# Patient Record
Sex: MALE | Employment: OTHER | URBAN - METROPOLITAN AREA
[De-identification: names, ages, dates, MRNs, and addresses within clinical notes are randomized per-mention and may not be internally consistent; named-entity substitution may affect disease eponyms.]

---

## 2019-03-14 LAB
EXT 24 HR UR METANEPHRINE: NORMAL
EXT 24 HR UR METANEPHRINE: NORMAL
EXT 24 HR UR NORMETANEPHRINE: NORMAL
EXT 25 HYDROXY VIT D2: NORMAL
EXT 25 HYDROXY VIT D2: NORMAL
EXT 25 HYDROXY VIT D3: NORMAL
EXT 25 HYDROXY VIT D3: NORMAL
EXT 5 HIAA 24 HR URINE: NORMAL
EXT 5 HIAA 24 HR URINE: NORMAL
EXT 5 HIAA BLOOD: NORMAL
EXT 5 HIAA BLOOD: NORMAL
EXT ACTH: NORMAL
EXT ACTH: NORMAL
EXT AFP: NORMAL
EXT AFP: NORMAL
EXT ALBUMIN: NORMAL
EXT ALBUMIN: NORMAL
EXT ALKALINE PHOSPHATASE: NORMAL
EXT ALKALINE PHOSPHATASE: NORMAL
EXT ALT: NORMAL
EXT ALT: NORMAL
EXT AMYLASE: NORMAL
EXT AMYLASE: NORMAL
EXT ANTI ISLET CELL AB: NORMAL
EXT ANTI ISLET CELL AB: NORMAL
EXT ANTI PARIETAL CELL AB: NORMAL
EXT ANTI PARIETAL CELL AB: NORMAL
EXT ANTI THYROID AB: NORMAL
EXT ANTI THYROID AB: NORMAL
EXT AST: NORMAL
EXT AST: NORMAL
EXT BILIRUBIN DIRECT: NORMAL
EXT BILIRUBIN DIRECT: NORMAL
EXT BILIRUBIN TOTAL: NORMAL
EXT BILIRUBIN TOTAL: NORMAL
EXT BK VIRUS DNA QN PCR: NORMAL
EXT BK VIRUS DNA QN PCR: NORMAL
EXT BUN: NORMAL
EXT BUN: NORMAL
EXT C PEPTIDE: NORMAL
EXT C PEPTIDE: NORMAL
EXT CA 125: NORMAL
EXT CA 125: NORMAL
EXT CA 19-9: NORMAL
EXT CA 19-9: NORMAL
EXT CA 27-29: NORMAL
EXT CA 27-29: NORMAL
EXT CALCITONIN: NORMAL
EXT CALCITONIN: NORMAL
EXT CALCIUM: NORMAL
EXT CALCIUM: NORMAL
EXT CEA: NORMAL
EXT CEA: NORMAL
EXT CHLORIDE: NORMAL
EXT CHLORIDE: NORMAL
EXT CHOLESTEROL: 115 MG/DL (ref 0–240)
EXT CHOLESTEROL: NORMAL
EXT CHROMOGRANIN A: NORMAL
EXT CHROMOGRANIN A: NORMAL
EXT CO2: NORMAL
EXT CO2: NORMAL
EXT CREATININE UA: NORMAL
EXT CREATININE UA: NORMAL
EXT CREATININE: NORMAL
EXT CREATININE: NORMAL
EXT CYCLOSPORONE LEVEL: NORMAL
EXT CYCLOSPORONE LEVEL: NORMAL
EXT DOPAMINE: NORMAL
EXT DOPAMINE: NORMAL
EXT EBV DNA BY PCR: NORMAL
EXT EBV DNA BY PCR: NORMAL
EXT EPINEPHRINE: NORMAL
EXT EPINEPHRINE: NORMAL
EXT FOLATE: NORMAL
EXT FOLATE: NORMAL
EXT FREE T3: NORMAL
EXT FREE T3: NORMAL
EXT FREE T4: NORMAL
EXT FREE T4: NORMAL
EXT FSH: NORMAL
EXT FSH: NORMAL
EXT GASTRIN RELEASING PEPTIDE: NORMAL
EXT GASTRIN: NORMAL
EXT GASTRIN: NORMAL
EXT GGT: NORMAL
EXT GGT: NORMAL
EXT GHRELIN: NORMAL
EXT GHRELIN: NORMAL
EXT GLUCAGON: NORMAL
EXT GLUCAGON: NORMAL
EXT GLUCOSE: NORMAL
EXT GLUCOSE: NORMAL
EXT GROWTH HORMONE: NORMAL
EXT GROWTH HORMONE: NORMAL
EXT HCV RNA QUANT PCR: NORMAL
EXT HCV RNA QUANT PCR: NORMAL
EXT HDL: 24.4 MG/DL
EXT HDL: 24.4 MG/DL
EXT HEMATOCRIT: NORMAL
EXT HEMATOCRIT: NORMAL
EXT HEMOGLOBIN A1C: NORMAL
EXT HEMOGLOBIN A1C: NORMAL
EXT HEMOGLOBIN: NORMAL
EXT HEMOGLOBIN: NORMAL
EXT HISTAMINE 24 HR URINE: NORMAL
EXT HISTAMINE 24 HR URINE: NORMAL
EXT HISTAMINE: NORMAL
EXT HISTAMINE: NORMAL
EXT IGF-1: NORMAL
EXT IGF-1: NORMAL
EXT IMMUNKNOW (NON-STIMULATED): NORMAL
EXT IMMUNKNOW (NON-STIMULATED): NORMAL
EXT IMMUNKNOW (STIMULATED): NORMAL
EXT IMMUNKNOW (STIMULATED): NORMAL
EXT INR: NORMAL
EXT INR: NORMAL
EXT INSULIN: NORMAL
EXT INSULIN: NORMAL
EXT LANREOTIDE LEVEL: NORMAL
EXT LANREOTIDE LEVEL: NORMAL
EXT LDH, TOTAL: 53.6 MG/DL (ref 0–100)
EXT LDH, TOTAL: 53.6 MG/DL (ref 0–100)
EXT LDL CHOLESTEROL: NORMAL
EXT LDL CHOLESTEROL: NORMAL
EXT LIPASE: NORMAL
EXT LIPASE: NORMAL
EXT MAGNESIUM: NORMAL
EXT MAGNESIUM: NORMAL
EXT METANEPHRINE FREE PLASMA: NORMAL
EXT METANEPHRINE FREE PLASMA: NORMAL
EXT MOTILIN: NORMAL
EXT MOTILIN: NORMAL
EXT NEUROKININ A CAMB: NORMAL
EXT NEUROKININ A CAMB: NORMAL
EXT NEUROKININ A ISI: NORMAL
EXT NEUROKININ A ISI: NORMAL
EXT NEUROTENSIN: NORMAL
EXT NEUROTENSIN: NORMAL
EXT NOREPINEPHRINE: NORMAL
EXT NOREPINEPHRINE: NORMAL
EXT NORMETANEPHRINE: NORMAL
EXT NORMETANEPHRINE: NORMAL
EXT NSE: NORMAL
EXT NSE: NORMAL
EXT OCTREOTIDE LEVEL: NORMAL
EXT OCTREOTIDE LEVEL: NORMAL
EXT PANCREASTATIN CAMB: NORMAL
EXT PANCREASTATIN CAMB: NORMAL
EXT PANCREASTATIN ISI: NORMAL
EXT PANCREASTATIN ISI: NORMAL
EXT PANCREATIC POLYPEPTIDE: NORMAL
EXT PANCREATIC POLYPEPTIDE: NORMAL
EXT PHOSPHORUS: NORMAL
EXT PHOSPHORUS: NORMAL
EXT PLATELETS: NORMAL
EXT PLATELETS: NORMAL
EXT POTASSIUM: NORMAL
EXT POTASSIUM: NORMAL
EXT PROGRAF LEVEL: NORMAL
EXT PROGRAF LEVEL: NORMAL
EXT PROLACTIN: NORMAL
EXT PROLACTIN: NORMAL
EXT PROTEIN TOTAL: NORMAL
EXT PROTEIN TOTAL: NORMAL
EXT PROTEIN UA: NORMAL
EXT PROTEIN UA: NORMAL
EXT PT: NORMAL
EXT PT: NORMAL
EXT PTH, INTACT: NORMAL
EXT PTH, INTACT: NORMAL
EXT PTT: NORMAL
EXT PTT: NORMAL
EXT RAPAMUNE LEVEL: NORMAL
EXT RAPAMUNE LEVEL: NORMAL
EXT SEROTONIN: NORMAL
EXT SEROTONIN: NORMAL
EXT SODIUM: NORMAL
EXT SODIUM: NORMAL
EXT SOMATOSTATIN: NORMAL
EXT SOMATOSTATIN: NORMAL
EXT SUBSTANCE P: NORMAL
EXT SUBSTANCE P: NORMAL
EXT TRIGLYCERIDES: 185 MG/DL (ref 0–200)
EXT TRIGLYCERIDES: 185 MG/DL (ref 0–200)
EXT TRYPTASE: NORMAL
EXT TRYPTASE: NORMAL
EXT TSH: NORMAL
EXT TSH: NORMAL
EXT URIC ACID: NORMAL
EXT URIC ACID: NORMAL
EXT URINE AMYLASE U/HR: NORMAL
EXT URINE AMYLASE U/HR: NORMAL
EXT URINE AMYLASE U/L: NORMAL
EXT URINE AMYLASE U/L: NORMAL
EXT VASOACTIVE INTESTINAL POLYPEPTIDE: NORMAL
EXT VASOACTIVE INTESTINAL POLYPEPTIDE: NORMAL
EXT VITAMIN B12: NORMAL
EXT VITAMIN B12: NORMAL
EXT VMA 24 HR URINE: NORMAL
EXT VMA 24 HR URINE: NORMAL
EXT WBC: NORMAL
EXT WBC: NORMAL
NEURON SPECIFIC ENOLASE: NORMAL
NEURON SPECIFIC ENOLASE: NORMAL

## 2019-08-22 LAB
EXT 24 HR UR METANEPHRINE: ABNORMAL
EXT 24 HR UR NORMETANEPHRINE: ABNORMAL
EXT 24 HR UR NORMETANEPHRINE: ABNORMAL
EXT 25 HYDROXY VIT D2: ABNORMAL
EXT 25 HYDROXY VIT D3: ABNORMAL
EXT 5 HIAA 24 HR URINE: ABNORMAL
EXT 5 HIAA BLOOD: ABNORMAL
EXT ACTH: ABNORMAL
EXT AFP: ABNORMAL
EXT ALBUMIN: ABNORMAL
EXT ALKALINE PHOSPHATASE: ABNORMAL
EXT ALT: ABNORMAL
EXT AMYLASE: ABNORMAL
EXT ANTI ISLET CELL AB: ABNORMAL
EXT ANTI PARIETAL CELL AB: ABNORMAL
EXT ANTI THYROID AB: ABNORMAL
EXT AST: ABNORMAL
EXT BILIRUBIN DIRECT: ABNORMAL
EXT BILIRUBIN TOTAL: 0.9 MG/DL (ref 0.1–1.3)
EXT BK VIRUS DNA QN PCR: ABNORMAL
EXT BUN: ABNORMAL
EXT C PEPTIDE: ABNORMAL
EXT CA 125: ABNORMAL
EXT CA 19-9: ABNORMAL
EXT CA 27-29: ABNORMAL
EXT CALCITONIN: ABNORMAL
EXT CALCIUM: ABNORMAL
EXT CEA: ABNORMAL
EXT CHLORIDE: ABNORMAL
EXT CHOLESTEROL: ABNORMAL
EXT CHROMOGRANIN A: ABNORMAL
EXT CO2: ABNORMAL
EXT CREATININE UA: ABNORMAL
EXT CREATININE: ABNORMAL
EXT CYCLOSPORONE LEVEL: ABNORMAL
EXT DOPAMINE: ABNORMAL
EXT EBV DNA BY PCR: ABNORMAL
EXT EPINEPHRINE: ABNORMAL
EXT FOLATE: ABNORMAL
EXT FREE T3: ABNORMAL
EXT FREE T4: ABNORMAL
EXT FSH: ABNORMAL
EXT GASTRIN RELEASING PEPTIDE: ABNORMAL
EXT GASTRIN RELEASING PEPTIDE: ABNORMAL
EXT GASTRIN: ABNORMAL
EXT GGT: ABNORMAL
EXT GHRELIN: ABNORMAL
EXT GLUCAGON: ABNORMAL
EXT GLUCOSE: ABNORMAL
EXT GROWTH HORMONE: ABNORMAL
EXT HCV RNA QUANT PCR: ABNORMAL
EXT HDL: ABNORMAL
EXT HEMATOCRIT: ABNORMAL
EXT HEMOGLOBIN A1C: 7.8 % (ref 4.2–5.8)
EXT HEMOGLOBIN: ABNORMAL
EXT HISTAMINE 24 HR URINE: ABNORMAL
EXT HISTAMINE: ABNORMAL
EXT IGF-1: ABNORMAL
EXT IMMUNKNOW (NON-STIMULATED): ABNORMAL
EXT IMMUNKNOW (STIMULATED): ABNORMAL
EXT INR: ABNORMAL
EXT INSULIN: ABNORMAL
EXT LANREOTIDE LEVEL: ABNORMAL
EXT LDH, TOTAL: ABNORMAL
EXT LDL CHOLESTEROL: ABNORMAL
EXT LIPASE: ABNORMAL
EXT MAGNESIUM: ABNORMAL
EXT METANEPHRINE FREE PLASMA: ABNORMAL
EXT MOTILIN: ABNORMAL
EXT NEUROKININ A CAMB: ABNORMAL
EXT NEUROKININ A ISI: ABNORMAL
EXT NEUROTENSIN: ABNORMAL
EXT NOREPINEPHRINE: ABNORMAL
EXT NORMETANEPHRINE: ABNORMAL
EXT NSE: ABNORMAL
EXT OCTREOTIDE LEVEL: ABNORMAL
EXT PANCREASTATIN CAMB: ABNORMAL
EXT PANCREASTATIN ISI: ABNORMAL
EXT PANCREATIC POLYPEPTIDE: ABNORMAL
EXT PHOSPHORUS: ABNORMAL
EXT PLATELETS: ABNORMAL
EXT POTASSIUM: ABNORMAL
EXT PROGRAF LEVEL: ABNORMAL
EXT PROLACTIN: ABNORMAL
EXT PROTEIN TOTAL: ABNORMAL
EXT PROTEIN UA: ABNORMAL
EXT PT: ABNORMAL
EXT PTH, INTACT: ABNORMAL
EXT PTT: ABNORMAL
EXT RAPAMUNE LEVEL: ABNORMAL
EXT SEROTONIN: ABNORMAL
EXT SODIUM: ABNORMAL
EXT SOMATOSTATIN: ABNORMAL
EXT SUBSTANCE P: ABNORMAL
EXT TRIGLYCERIDES: ABNORMAL
EXT TRYPTASE: ABNORMAL
EXT TSH: ABNORMAL
EXT URIC ACID: ABNORMAL
EXT URINE AMYLASE U/HR: ABNORMAL
EXT URINE AMYLASE U/L: ABNORMAL
EXT VASOACTIVE INTESTINAL POLYPEPTIDE: ABNORMAL
EXT VITAMIN B12: ABNORMAL
EXT VMA 24 HR URINE: ABNORMAL
EXT WBC: ABNORMAL
NEURON SPECIFIC ENOLASE: ABNORMAL

## 2019-10-09 LAB
EXT 24 HR UR METANEPHRINE: ABNORMAL
EXT 24 HR UR NORMETANEPHRINE: ABNORMAL
EXT 24 HR UR NORMETANEPHRINE: ABNORMAL
EXT 25 HYDROXY VIT D2: ABNORMAL
EXT 25 HYDROXY VIT D3: ABNORMAL
EXT 5 HIAA 24 HR URINE: ABNORMAL
EXT 5 HIAA BLOOD: ABNORMAL
EXT ACTH: ABNORMAL
EXT AFP: ABNORMAL
EXT ALBUMIN: ABNORMAL
EXT ALKALINE PHOSPHATASE: ABNORMAL
EXT ALT: ABNORMAL
EXT AMYLASE: ABNORMAL
EXT ANTI ISLET CELL AB: ABNORMAL
EXT ANTI PARIETAL CELL AB: ABNORMAL
EXT ANTI THYROID AB: ABNORMAL
EXT AST: ABNORMAL
EXT BILIRUBIN DIRECT: ABNORMAL
EXT BILIRUBIN TOTAL: 0.9 MG/DL (ref 0.1–1.3)
EXT BK VIRUS DNA QN PCR: ABNORMAL
EXT BUN: ABNORMAL
EXT C PEPTIDE: ABNORMAL
EXT CA 125: ABNORMAL
EXT CA 19-9: ABNORMAL
EXT CA 27-29: ABNORMAL
EXT CALCITONIN: ABNORMAL
EXT CALCIUM: ABNORMAL
EXT CEA: ABNORMAL
EXT CHLORIDE: ABNORMAL
EXT CHOLESTEROL: ABNORMAL
EXT CHROMOGRANIN A: ABNORMAL
EXT CO2: 28 MEQ/L (ref 22–32)
EXT CREATININE UA: ABNORMAL
EXT CREATININE: 152.4 MG/DL
EXT CYCLOSPORONE LEVEL: ABNORMAL
EXT DOPAMINE: ABNORMAL
EXT EBV DNA BY PCR: ABNORMAL
EXT EPINEPHRINE: ABNORMAL
EXT FOLATE: ABNORMAL
EXT FREE T3: ABNORMAL
EXT FREE T4: ABNORMAL
EXT FSH: ABNORMAL
EXT GASTRIN RELEASING PEPTIDE: ABNORMAL
EXT GASTRIN RELEASING PEPTIDE: ABNORMAL
EXT GASTRIN: ABNORMAL
EXT GGT: ABNORMAL
EXT GHRELIN: ABNORMAL
EXT GLUCAGON: ABNORMAL
EXT GLUCOSE: 119 MG/DL (ref 70–110)
EXT GROWTH HORMONE: ABNORMAL
EXT HCV RNA QUANT PCR: ABNORMAL
EXT HDL: ABNORMAL
EXT HEMATOCRIT: 32.5 % (ref 42–52)
EXT HEMOGLOBIN A1C: ABNORMAL
EXT HEMOGLOBIN: 10.9 G/DL (ref 14–18)
EXT HISTAMINE 24 HR URINE: ABNORMAL
EXT HISTAMINE: ABNORMAL
EXT IGF-1: ABNORMAL
EXT IMMUNKNOW (NON-STIMULATED): ABNORMAL
EXT IMMUNKNOW (STIMULATED): ABNORMAL
EXT INR: ABNORMAL
EXT INSULIN: ABNORMAL
EXT LANREOTIDE LEVEL: ABNORMAL
EXT LDH, TOTAL: ABNORMAL
EXT LDL CHOLESTEROL: ABNORMAL
EXT LIPASE: ABNORMAL
EXT MAGNESIUM: ABNORMAL
EXT METANEPHRINE FREE PLASMA: ABNORMAL
EXT MOTILIN: ABNORMAL
EXT NEUROKININ A CAMB: ABNORMAL
EXT NEUROKININ A ISI: ABNORMAL
EXT NEUROTENSIN: ABNORMAL
EXT NOREPINEPHRINE: ABNORMAL
EXT NORMETANEPHRINE: ABNORMAL
EXT NSE: ABNORMAL
EXT OCTREOTIDE LEVEL: ABNORMAL
EXT PANCREASTATIN CAMB: ABNORMAL
EXT PANCREASTATIN ISI: ABNORMAL
EXT PANCREATIC POLYPEPTIDE: ABNORMAL
EXT PHOSPHORUS: ABNORMAL
EXT PLATELETS: 223 K/UL (ref 140–420)
EXT POTASSIUM: 4.1 MEQ/L (ref 3.6–5.1)
EXT PROGRAF LEVEL: ABNORMAL
EXT PROLACTIN: ABNORMAL
EXT PROTEIN TOTAL: ABNORMAL
EXT PROTEIN UA: ABNORMAL
EXT PT: ABNORMAL
EXT PTH, INTACT: ABNORMAL
EXT PTT: ABNORMAL
EXT RAPAMUNE LEVEL: ABNORMAL
EXT SEROTONIN: ABNORMAL
EXT SODIUM: 137 MEQ/L (ref 136–144)
EXT SOMATOSTATIN: ABNORMAL
EXT SUBSTANCE P: ABNORMAL
EXT TRIGLYCERIDES: ABNORMAL
EXT TRYPTASE: ABNORMAL
EXT TSH: ABNORMAL
EXT URIC ACID: ABNORMAL
EXT URINE AMYLASE U/HR: ABNORMAL
EXT URINE AMYLASE U/L: ABNORMAL
EXT VASOACTIVE INTESTINAL POLYPEPTIDE: ABNORMAL
EXT VITAMIN B12: ABNORMAL
EXT VMA 24 HR URINE: ABNORMAL
EXT WBC: 4.5 K/UL (ref 4.8–10.8)
NEURON SPECIFIC ENOLASE: ABNORMAL

## 2019-10-26 LAB
EXT 24 HR UR METANEPHRINE: ABNORMAL
EXT 24 HR UR NORMETANEPHRINE: ABNORMAL
EXT 24 HR UR NORMETANEPHRINE: ABNORMAL
EXT 25 HYDROXY VIT D2: ABNORMAL
EXT 25 HYDROXY VIT D3: ABNORMAL
EXT 5 HIAA 24 HR URINE: ABNORMAL
EXT 5 HIAA BLOOD: ABNORMAL
EXT ACTH: ABNORMAL
EXT AFP: ABNORMAL
EXT ALBUMIN: ABNORMAL
EXT ALKALINE PHOSPHATASE: ABNORMAL
EXT ALT: ABNORMAL
EXT AMYLASE: ABNORMAL
EXT ANTI ISLET CELL AB: ABNORMAL
EXT ANTI PARIETAL CELL AB: ABNORMAL
EXT ANTI THYROID AB: ABNORMAL
EXT AST: ABNORMAL
EXT BILIRUBIN DIRECT: ABNORMAL
EXT BILIRUBIN TOTAL: ABNORMAL
EXT BK VIRUS DNA QN PCR: ABNORMAL
EXT BUN: ABNORMAL
EXT C PEPTIDE: ABNORMAL
EXT CA 125: ABNORMAL
EXT CA 19-9: ABNORMAL
EXT CA 27-29: ABNORMAL
EXT CALCITONIN: ABNORMAL
EXT CALCIUM: ABNORMAL
EXT CEA: ABNORMAL
EXT CHLORIDE: ABNORMAL
EXT CHOLESTEROL: ABNORMAL
EXT CHROMOGRANIN A: ABNORMAL
EXT CO2: ABNORMAL
EXT CREATININE UA: ABNORMAL
EXT CREATININE: ABNORMAL
EXT CYCLOSPORONE LEVEL: ABNORMAL
EXT DOPAMINE: ABNORMAL
EXT EBV DNA BY PCR: ABNORMAL
EXT EPINEPHRINE: ABNORMAL
EXT FOLATE: ABNORMAL
EXT FREE T3: ABNORMAL
EXT FREE T4: ABNORMAL
EXT FSH: ABNORMAL
EXT GASTRIN RELEASING PEPTIDE: ABNORMAL
EXT GASTRIN RELEASING PEPTIDE: ABNORMAL
EXT GASTRIN: ABNORMAL
EXT GGT: ABNORMAL
EXT GHRELIN: ABNORMAL
EXT GLUCAGON: ABNORMAL
EXT GLUCOSE: 202 MG/DL (ref 70–110)
EXT GROWTH HORMONE: ABNORMAL
EXT HCV RNA QUANT PCR: ABNORMAL
EXT HDL: ABNORMAL
EXT HEMATOCRIT: ABNORMAL
EXT HEMOGLOBIN A1C: ABNORMAL
EXT HEMOGLOBIN: ABNORMAL
EXT HISTAMINE 24 HR URINE: ABNORMAL
EXT HISTAMINE: ABNORMAL
EXT IGF-1: ABNORMAL
EXT IMMUNKNOW (NON-STIMULATED): ABNORMAL
EXT IMMUNKNOW (STIMULATED): ABNORMAL
EXT INR: ABNORMAL
EXT INSULIN: ABNORMAL
EXT LANREOTIDE LEVEL: ABNORMAL
EXT LDH, TOTAL: ABNORMAL
EXT LDL CHOLESTEROL: ABNORMAL
EXT LIPASE: ABNORMAL
EXT MAGNESIUM: ABNORMAL
EXT METANEPHRINE FREE PLASMA: ABNORMAL
EXT MOTILIN: ABNORMAL
EXT NEUROKININ A CAMB: ABNORMAL
EXT NEUROKININ A ISI: ABNORMAL
EXT NEUROTENSIN: ABNORMAL
EXT NOREPINEPHRINE: ABNORMAL
EXT NORMETANEPHRINE: ABNORMAL
EXT NSE: ABNORMAL
EXT OCTREOTIDE LEVEL: ABNORMAL
EXT PANCREASTATIN CAMB: ABNORMAL
EXT PANCREASTATIN ISI: ABNORMAL
EXT PANCREATIC POLYPEPTIDE: ABNORMAL
EXT PHOSPHORUS: ABNORMAL
EXT PLATELETS: ABNORMAL
EXT POTASSIUM: ABNORMAL
EXT PROGRAF LEVEL: ABNORMAL
EXT PROLACTIN: ABNORMAL
EXT PROTEIN TOTAL: ABNORMAL
EXT PROTEIN UA: ABNORMAL
EXT PT: ABNORMAL
EXT PTH, INTACT: ABNORMAL
EXT PTT: ABNORMAL
EXT RAPAMUNE LEVEL: ABNORMAL
EXT SEROTONIN: ABNORMAL
EXT SODIUM: ABNORMAL
EXT SOMATOSTATIN: ABNORMAL
EXT SUBSTANCE P: ABNORMAL
EXT TRIGLYCERIDES: ABNORMAL
EXT TRYPTASE: ABNORMAL
EXT TSH: ABNORMAL
EXT URIC ACID: ABNORMAL
EXT URINE AMYLASE U/HR: ABNORMAL
EXT URINE AMYLASE U/L: ABNORMAL
EXT VASOACTIVE INTESTINAL POLYPEPTIDE: ABNORMAL
EXT VITAMIN B12: ABNORMAL
EXT VMA 24 HR URINE: ABNORMAL
EXT WBC: ABNORMAL
NEURON SPECIFIC ENOLASE: ABNORMAL

## 2019-11-04 ENCOUNTER — TELEPHONE (OUTPATIENT)
Dept: NEUROLOGY | Facility: HOSPITAL | Age: 74
End: 2019-11-04

## 2019-11-04 DIAGNOSIS — N28.89 RENAL MASS: Primary | ICD-10-CM

## 2019-11-04 NOTE — TELEPHONE ENCOUNTER
----- Message from Christa Katz sent at 11/1/2019 11:52 AM CDT -----  Regarding: New Patient Referral   Contact: [phone]  New- [facility] referral was sent via email.

## 2019-11-05 NOTE — TELEPHONE ENCOUNTER
"Spoke with pt. He verbalized that he is being referred to see Dr. Jenkins for a "mass in his abdomen."  He had an MRI but has not had a biopsy.  His referral paperwork states "renal mass- ablation vs surgery- no decision made yet".  He will bring the MRI to our office tomorrow.  Discussed with Dr. Jenkins.  Will review his images and possibly make recommendations.  "

## 2019-11-07 NOTE — TELEPHONE ENCOUNTER
Left message for patient to return call on voice mail to set up an appointment with Dr. Marks for eval and a CT abd/w/wo contrast.

## 2019-11-07 NOTE — TELEPHONE ENCOUNTER
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 I can biopsy it if you want me too, but usually these just go to partial nephrectomy and the diagnosis is made at that time. You want me to ablate it, I would biopsy it at that time. If we're going to go down that road, CT would be helpful to get a better look at it, even a CT without contrast.     There is also cystic lesion in the head of the pancreas. Not sure what that is, but it may be an IPMN.

## 2019-11-08 ENCOUNTER — TELEPHONE (OUTPATIENT)
Dept: NEUROLOGY | Facility: HOSPITAL | Age: 74
End: 2019-11-08

## 2019-11-08 DIAGNOSIS — N28.89 RENAL MASS: Primary | ICD-10-CM

## 2019-11-08 NOTE — TELEPHONE ENCOUNTER
Spoke with pt, informed of recommendation of Radiologist and MD for CT or non con ct if needed and md servando.  Pt reports renal insuff with creat at 2.0.  sched a non contrast ct and md servando with Dr. Marks. All questions answered.

## 2019-11-08 NOTE — TELEPHONE ENCOUNTER
----- Message from Agata Luna sent at 11/8/2019  8:51 AM CST -----  Contact: [phone]/sef   MARIA ESTHER   Patient states he is returning your call to schedule an appt. Please advise.

## 2019-11-08 NOTE — TELEPHONE ENCOUNTER
Called patient to set up servando with Dr. Marks and to sched a CT scan & labs prior to appointment.

## 2019-11-14 ENCOUNTER — HOSPITAL ENCOUNTER (OUTPATIENT)
Dept: RADIOLOGY | Facility: HOSPITAL | Age: 74
Discharge: HOME | End: 2019-11-14
Attending: SURGERY
Payer: COMMERCIAL

## 2019-11-14 DIAGNOSIS — N28.89 RENAL MASS: ICD-10-CM

## 2019-11-14 PROCEDURE — 71250 CT THORAX DX C-: CPT | Mod: TC,PO

## 2019-11-14 PROCEDURE — 74176 CT ABD & PELVIS W/O CONTRAST: CPT | Mod: TC,PO

## 2019-11-18 ENCOUNTER — TELEPHONE (OUTPATIENT)
Dept: NEUROLOGY | Facility: HOSPITAL | Age: 74
End: 2019-11-18

## 2019-11-18 ENCOUNTER — OFFICE VISIT (OUTPATIENT)
Dept: NEUROLOGY | Facility: HOSPITAL | Age: 74
End: 2019-11-18
Attending: SURGERY
Payer: COMMERCIAL

## 2019-11-18 VITALS
DIASTOLIC BLOOD PRESSURE: 70 MMHG | HEIGHT: 69 IN | HEART RATE: 58 BPM | SYSTOLIC BLOOD PRESSURE: 134 MMHG | WEIGHT: 261.44 LBS | TEMPERATURE: 97 F | BODY MASS INDEX: 38.72 KG/M2

## 2019-11-18 DIAGNOSIS — C64.1 RENAL CELL CANCER, RIGHT: Primary | ICD-10-CM

## 2019-11-18 DIAGNOSIS — K80.11 CALCULUS OF GALLBLADDER WITH CHOLECYSTITIS WITH BILIARY OBSTRUCTION, UNSPECIFIED CHOLECYSTITIS ACUITY: ICD-10-CM

## 2019-11-18 DIAGNOSIS — N28.89 RENAL MASS: Primary | ICD-10-CM

## 2019-11-18 DIAGNOSIS — D49.0 IPMN (INTRADUCTAL PAPILLARY MUCINOUS NEOPLASM): ICD-10-CM

## 2019-11-18 PROCEDURE — 99213 OFFICE O/P EST LOW 20 MIN: CPT | Performed by: SURGERY

## 2019-11-18 RX ORDER — INSULIN ASPART 100 [IU]/ML
20 INJECTION, SOLUTION INTRAVENOUS; SUBCUTANEOUS NIGHTLY
COMMUNITY
Start: 2014-10-09

## 2019-11-18 RX ORDER — LEVOTHYROXINE SODIUM 100 UG/1
100 TABLET ORAL
COMMUNITY
Start: 2013-12-10

## 2019-11-18 RX ORDER — INSULIN GLARGINE 100 [IU]/ML
60 INJECTION, SOLUTION SUBCUTANEOUS
COMMUNITY
Start: 2014-10-09

## 2019-11-18 RX ORDER — FENOFIBRATE 145 MG/1
TABLET, FILM COATED ORAL
COMMUNITY
Start: 2010-10-13

## 2019-11-18 RX ORDER — FOLIC ACID 1 MG/1
1 TABLET ORAL
COMMUNITY
Start: 2019-08-21

## 2019-11-18 RX ORDER — CARVEDILOL 6.25 MG/1
6.25 TABLET ORAL
COMMUNITY
Start: 2019-08-21

## 2019-11-18 RX ORDER — LANOLIN ALCOHOL/MO/W.PET/CERES
1000 CREAM (GRAM) TOPICAL
COMMUNITY
Start: 2019-08-21

## 2019-11-18 NOTE — PATIENT INSTRUCTIONS
Your Lab work is ordered for today outpatient Diagnostics      Echo for in the morning here at 700 am    Next appt with Dr. Birch is 12/25/19 at 900 am

## 2019-11-18 NOTE — PROGRESS NOTES
"NOLANETS:  [facility]  A collaboration between [facility] and [facility]      PATIENT: Javed Armijo  MRN: [id number]  DATE: 11/18/2019    Subjective:      Chief Complaint: Consult  rash a year ago. 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 He was getting treatment for this since one year. Us kidney was done to evaluate renal function. Found to have renal cystic mass. CT scan showed heterogenous cysts. MRI was done in [place].    DURING THE CT SCAN WAS FOUND TO HAVE ASSISTANT HEAD OF THE PANCREAS.  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     ECOG Score: 1 - Symptomatic but completely ambulatory    Diagnosis: No diagnosis found.     Interval History:     Oncologic History:   Oncologic History    Oncologic Treatment    Pathology      Past Medical History:  Past Medical History:   Diagnosis Date    CKD (chronic kidney disease)     DM (diabetes mellitus)     HTN (hypertension)     Hyperlipidemia     Hypothyroidism     Kidney mass     TIFFANIE (obstructive sleep apnea)     on CPAP       Past Surgical History:  Past Surgical History:   Procedure Laterality Date    CORONARY ARTERY BYPASS GRAFT  06/2017    x2    MUSCLE REPAIR Right 10/2014    bicep    NASAL SEPTOPLASTY  10/2018    TOTAL KNEE ARTHROPLASTY  2010    UMBILICAL HERNIA REPAIR  12/2011   knee replacement both sides in 2010    Family History:  History reviewed. No pertinent family history.    Allergies:  Lisinopril    Medications:   Current Outpatient Medications   Medication Sig    aspirin-calcium carbonate 81 mg-300 mg " calcium(777 mg) Tab Take 81 mg by mouth.    atorvastatin calcium (LIPITOR ORAL) Take 20 mg by mouth.    carvedilol (COREG) 6.25 MG tablet Take 6.25 mg by mouth.    cyanocobalamin (VITAMIN B-12) 1000 MCG tablet Take 1,000 mcg by mouth.    fenofibrate (TRICOR) 145 MG tablet Take by mouth.    folic acid (FOLVITE) 1 MG tablet Take 1 mg by mouth.    insulin (LANTUS SOLOSTAR U-100 INSULIN) glargine 100 units/mL (3mL) SubQ pen     insulin aspart U-100 (NOVOLOG FLEXPEN U-100 INSULIN) 100 unit/mL (3 mL) InPn pen     levothyroxine (SYNTHROID) 100 MCG tablet Take 100 mcg by mouth.    liraglutide 0.6 mg/0.1 mL, 18 mg/3 mL, subq PNIJ (VICTOZA 2-CITLALY) 0.6 mg/0.1 mL (18 mg/3 mL) PnIj 1.8 mg.    omega-3 fatty acids 100 mg Chew Take 4,000 mg by mouth.     No current facility-administered medications for this visit.         Review of Systems   Constitutional: Negative for activity change, appetite change, chills, diaphoresis, fatigue, fever and unexpected weight change.   HENT: Negative.  Negative for dental problem, drooling, ear discharge, ear pain, facial swelling, hearing loss, mouth sores, nosebleeds, postnasal drip, rhinorrhea, sinus pressure, sinus pain, sneezing, sore throat, tinnitus, trouble swallowing and voice change.    Eyes: Negative for photophobia, pain, discharge, redness, itching and visual disturbance.   Respiratory: Negative for apnea, cough, choking, chest tightness, shortness of breath, wheezing and stridor.    Cardiovascular: Negative for chest pain, palpitations and leg swelling.   Gastrointestinal: Negative for abdominal pain, anal bleeding, blood in stool, constipation, diarrhea, nausea, rectal pain and vomiting.   Endocrine: Negative.    Genitourinary: Negative.    Musculoskeletal: Negative for arthralgias, back pain, joint swelling, myalgias, neck pain and neck stiffness.   Skin: Negative for color change, pallor, rash and wound.   Allergic/Immunologic: Negative.    Neurological: Negative for  tremors, seizures, syncope, facial asymmetry, weakness, light-headedness, numbness and headaches.   Hematological: Negative for adenopathy. Does not bruise/bleed easily.   Psychiatric/Behavioral: Negative.  Negative for confusion, dysphoric mood and hallucinations. The patient is not hyperactive.       Objective:      Physical Exam   Constitutional: He is oriented to person, place, and time. He appears well-developed and well-nourished.   HENT:   Head: Normocephalic and atraumatic.   Right Ear: External ear normal.   Left Ear: External ear normal.   Eyes: Pupils are equal, round, and reactive to light. Conjunctivae and EOM are normal.   Neck: Normal range of motion.   Cardiovascular: Normal rate and regular rhythm.   Pulmonary/Chest: Effort normal and breath sounds normal.   Abdominal: Soft. Bowel sounds are normal. He exhibits no mass. There is no tenderness. There is no rebound and no guarding. No hernia.   Musculoskeletal: Normal range of motion.   Neurological: He is alert and oriented to person, place, and time.   Skin: Skin is warm and dry.   Psychiatric: He has a normal mood and affect. His behavior is normal.     has multiple papules scattered also the lower extremities  Assessment:       No diagnosis found.    Laboratory Data:   Results for orders placed or performed in visit on 11/05/19   NEURO ENDO LABS   Result Value Ref Range    EXT AFP      EXT CEA      EXT CA 19-9      EXT CA 27-29      EXT       EXT CYCLOSPORONE LEVEL      EXT PROGRAF LEVEL      EXT RAPAMUNE LEVEL      EXT 5 HIAA 24 HR URINE      EXT 5 HIAA BLOOD      EXT GASTRIN      EXT SEROTONIN      EXT CHROMOGRANIN A      EXT PANCREASTATIN BROWN      EXT PANCREASTATIN CAMB      EXT NEUROKININ A BROWN      EXT NEUROKININ A CAMB      EXT OCTREOTIDE LEVEL      EXT LANREOTIDE LEVEL      EXT ANTI PARIETAL CELL AB      EXT ANTI THYROID AB      EXT ANTI ISLET CELL AB      EXT Folate      EXT VITAMIN B12      EXT GLUCAGON      EXT PANCREATIC  POLYPEPTIDE      EXT VASOACTIVE INTESTINAL POLYPEPTIDE      EXT C PEPTIDE      EXT INSULIN      EXT SOMATOSTATIN      EXT SUBSTANCE P      EXT NEUROTENSIN      EXT CALCITONIN      EXT HISTAMINE      EXT TRYPTASE      EXT GASTRIN RELEASING PEPTIDE      EXT PTH, Intact      EXT FSH      EXT PROLACTIN      EXT MOTILIN      EXT GHRELIN      EXT IGF-1      EXT GROWTH HORMONE      EXT ACTH      EXT GASTRIN RELEASING PEPTIDE      EXT VMA 24 HR URINE      EXT HISTAMINE 24 HR URINE      EXT FREE T4      EXT FREE T3      EXT TSH      EXT 25 HYDROXY VIT D2      EXT 25 HYDROXY VIT D3      EXT WBC      EXT Hemoglobin      EXT Hematocrit      EXT Platelets      EXT PT      EXT PTT      EXT INR      EXT Glucose      EXT BUN      EXT Creatinine      EXT Sodium      EXT Potassium      EXT Chloride      EXT CO2      EXT Calcium      EXT Protein total      EXT Phosphorus      EXT Albumin      EXT Uric Acid      EXT BilirubiN Total      EXT Bilirubin Direct      EXT Alkaline Phosphatase      EXT GGT      EXT AST      EXT ALT      EXT Magnesium      EXT Lipase      EXT Amylase      EXT LDH, Total 53.6 0 - 100 mg/dl    EXT Triglycerides 185 0 - 200 mg/dl    EXT Cholesterol 115 0 - 240 mg/dl    EXT HDL 24.4 mg/dl    EXT LDL Cholesterol      EXT URINE AMYLASE U/HR      EXT URINE AMYLASE U/L      EXT Protein UA      EXT CREATININE UA      EXT Immunknow (stimulated)      EXT Immunknow (non-stimulated)      EXT EBV DNA by PCR      EXT BK Virus DNA QN PCR      EXT Hemoglobin A1C      Neuron Specific Enolase      EXT NSE      ext epinephrine      ext 24 hr ur normetanephrine      ext metanephrine free plasma      ext 24 hr ur metanephrine      ext 24 hr ur normetanephrine      ext normetanephrine      ext dopamine      EXT NOREPINEPHRINE      ext hcv rna quant pcr     NEURO ENDO LABS   Result Value Ref Range    EXT AFP      EXT CEA      EXT CA 19-9      EXT CA 27-29      EXT       EXT CYCLOSPORONE LEVEL      EXT PROGRAF LEVEL      EXT  RAPAMUNE LEVEL      EXT 5 HIAA 24 HR URINE      EXT 5 HIAA BLOOD      EXT GASTRIN      EXT SEROTONIN      EXT CHROMOGRANIN A      EXT PANCREASTATIN BROWN      EXT PANCREASTATIN CAMB      EXT NEUROKININ A BROWN      EXT NEUROKININ A CAMB      EXT OCTREOTIDE LEVEL      EXT LANREOTIDE LEVEL      EXT ANTI PARIETAL CELL AB      EXT ANTI THYROID AB      EXT ANTI ISLET CELL AB      EXT Folate      EXT VITAMIN B12      EXT GLUCAGON      EXT PANCREATIC POLYPEPTIDE      EXT VASOACTIVE INTESTINAL POLYPEPTIDE      EXT C PEPTIDE      EXT INSULIN      EXT SOMATOSTATIN      EXT SUBSTANCE P      EXT NEUROTENSIN      EXT CALCITONIN      EXT HISTAMINE      EXT TRYPTASE      EXT GASTRIN RELEASING PEPTIDE      EXT PTH, Intact      EXT FSH      EXT PROLACTIN      EXT MOTILIN      EXT GHRELIN      EXT IGF-1      EXT GROWTH HORMONE      EXT ACTH      EXT GASTRIN RELEASING PEPTIDE      EXT VMA 24 HR URINE      EXT HISTAMINE 24 HR URINE      EXT FREE T4      EXT FREE T3      EXT TSH      EXT 25 HYDROXY VIT D2      EXT 25 HYDROXY VIT D3      EXT WBC 4.5 (A) 4.8 - 10.8 k/ul    EXT Hemoglobin 10.9 (A) 14.0 - 18.0 g/dl    EXT Hematocrit 32.5 (A) 42.0 - 52.0 %    EXT Platelets 223 140 - 420 k/ul    EXT PT      EXT PTT      EXT INR      EXT Glucose 119 (A) 70 - 110 mg/dl    EXT BUN      EXT Creatinine 152.4 mg/dL    EXT Sodium 137 136 - 144 meq/l    EXT Potassium 4.1 3.6 - 5.1 meq/l    EXT Chloride      EXT CO2 28 22 - 32 meq/l    EXT Calcium      EXT Protein total      EXT Phosphorus      EXT Albumin      EXT Uric Acid      EXT BilirubiN Total 0.9 0.1 - 1.3 mg/dl    EXT Bilirubin Direct      EXT Alkaline Phosphatase      EXT GGT      EXT AST      EXT ALT      EXT Magnesium      EXT Lipase      EXT Amylase      EXT LDH, Total      EXT Triglycerides      EXT Cholesterol      EXT HDL      EXT LDL Cholesterol      EXT URINE AMYLASE U/HR      EXT URINE AMYLASE U/L      EXT Protein UA      EXT CREATININE UA      EXT Immunknow (stimulated)      EXT  Immunknow (non-stimulated)      EXT EBV DNA by PCR      EXT BK Virus DNA QN PCR      EXT Hemoglobin A1C      Neuron Specific Enolase      EXT NSE      ext epinephrine      ext 24 hr ur normetanephrine      ext metanephrine free plasma      ext 24 hr ur metanephrine      ext 24 hr ur normetanephrine      ext normetanephrine      ext dopamine      EXT NOREPINEPHRINE      ext hcv rna quant pcr     NEURO ENDO LABS   Result Value Ref Range    EXT AFP      EXT CEA      EXT CA 19-9      EXT CA 27-29      EXT       EXT CYCLOSPORONE LEVEL      EXT PROGRAF LEVEL      EXT RAPAMUNE LEVEL      EXT 5 HIAA 24 HR URINE      EXT 5 HIAA BLOOD      EXT GASTRIN      EXT SEROTONIN      EXT CHROMOGRANIN A      EXT PANCREASTATIN BROWN      EXT PANCREASTATIN CAMB      EXT NEUROKININ A BROWN      EXT NEUROKININ A CAMB      EXT OCTREOTIDE LEVEL      EXT LANREOTIDE LEVEL      EXT ANTI PARIETAL CELL AB      EXT ANTI THYROID AB      EXT ANTI ISLET CELL AB      EXT Folate      EXT VITAMIN B12      EXT GLUCAGON      EXT PANCREATIC POLYPEPTIDE      EXT VASOACTIVE INTESTINAL POLYPEPTIDE      EXT C PEPTIDE      EXT INSULIN      EXT SOMATOSTATIN      EXT SUBSTANCE P      EXT NEUROTENSIN      EXT CALCITONIN      EXT HISTAMINE      EXT TRYPTASE      EXT GASTRIN RELEASING PEPTIDE      EXT PTH, Intact      EXT FSH      EXT PROLACTIN      EXT MOTILIN      EXT GHRELIN      EXT IGF-1      EXT GROWTH HORMONE      EXT ACTH      EXT GASTRIN RELEASING PEPTIDE      EXT VMA 24 HR URINE      EXT HISTAMINE 24 HR URINE      EXT FREE T4      EXT FREE T3      EXT TSH      EXT 25 HYDROXY VIT D2      EXT 25 HYDROXY VIT D3      EXT WBC      EXT Hemoglobin      EXT Hematocrit      EXT Platelets      EXT PT      EXT PTT      EXT INR      EXT Glucose 202 (A) 70 - 110 mg/dl    EXT BUN      EXT Creatinine      EXT Sodium      EXT Potassium      EXT Chloride      EXT CO2      EXT Calcium      EXT Protein total      EXT Phosphorus      EXT Albumin      EXT Uric Acid      EXT  BilirubiN Total      EXT Bilirubin Direct      EXT Alkaline Phosphatase      EXT GGT      EXT AST      EXT ALT      EXT Magnesium      EXT Lipase      EXT Amylase      EXT LDH, Total      EXT Triglycerides      EXT Cholesterol      EXT HDL      EXT LDL Cholesterol      EXT URINE AMYLASE U/HR      EXT URINE AMYLASE U/L      EXT Protein UA      EXT CREATININE UA      EXT Immunknow (stimulated)      EXT Immunknow (non-stimulated)      EXT EBV DNA by PCR      EXT BK Virus DNA QN PCR      EXT Hemoglobin A1C      Neuron Specific Enolase      EXT NSE      ext epinephrine      ext 24 hr ur normetanephrine      ext metanephrine free plasma      ext 24 hr ur metanephrine      ext 24 hr ur normetanephrine      ext normetanephrine      ext dopamine      EXT NOREPINEPHRINE      ext hcv rna quant pcr     NEURO ENDO LABS   Result Value Ref Range    EXT AFP      EXT CEA      EXT CA 19-9      EXT CA 27-29      EXT       EXT CYCLOSPORONE LEVEL      EXT PROGRAF LEVEL      EXT RAPAMUNE LEVEL      EXT 5 HIAA 24 HR URINE      EXT 5 HIAA BLOOD      EXT GASTRIN      EXT SEROTONIN      EXT CHROMOGRANIN A      EXT PANCREASTATIN BROWN      EXT PANCREASTATIN CAMB      EXT NEUROKININ A BROWN      EXT NEUROKININ A CAMB      EXT OCTREOTIDE LEVEL      EXT LANREOTIDE LEVEL      EXT ANTI PARIETAL CELL AB      EXT ANTI THYROID AB      EXT ANTI ISLET CELL AB      EXT Folate      EXT VITAMIN B12      EXT GLUCAGON      EXT PANCREATIC POLYPEPTIDE      EXT VASOACTIVE INTESTINAL POLYPEPTIDE      EXT C PEPTIDE      EXT INSULIN      EXT SOMATOSTATIN      EXT SUBSTANCE P      EXT NEUROTENSIN      EXT CALCITONIN      EXT HISTAMINE      EXT TRYPTASE      EXT GASTRIN RELEASING PEPTIDE      EXT PTH, Intact      EXT FSH      EXT PROLACTIN      EXT MOTILIN      EXT GHRELIN      EXT IGF-1      EXT GROWTH HORMONE      EXT ACTH      EXT GASTRIN RELEASING PEPTIDE      EXT VMA 24 HR URINE      EXT HISTAMINE 24 HR URINE      EXT FREE T4      EXT FREE T3      EXT TSH       EXT 25 HYDROXY VIT D2      EXT 25 HYDROXY VIT D3      EXT WBC      EXT Hemoglobin      EXT Hematocrit      EXT Platelets      EXT PT      EXT PTT      EXT INR      EXT Glucose      EXT BUN      EXT Creatinine      EXT Sodium      EXT Potassium      EXT Chloride      EXT CO2      EXT Calcium      EXT Protein total      EXT Phosphorus      EXT Albumin      EXT Uric Acid      EXT BilirubiN Total 0.9 0.1 - 1.3 mg/dl    EXT Bilirubin Direct      EXT Alkaline Phosphatase      EXT GGT      EXT AST      EXT ALT      EXT Magnesium      EXT Lipase      EXT Amylase      EXT LDH, Total      EXT Triglycerides      EXT Cholesterol      EXT HDL      EXT LDL Cholesterol      EXT URINE AMYLASE U/HR      EXT URINE AMYLASE U/L      EXT Protein UA      EXT CREATININE UA      EXT Immunknow (stimulated)      EXT Immunknow (non-stimulated)      EXT EBV DNA by PCR      EXT BK Virus DNA QN PCR      EXT Hemoglobin A1C 7.8 (A) 4.2 - 5.8 %    Neuron Specific Enolase      EXT NSE      ext epinephrine      ext 24 hr ur normetanephrine      ext metanephrine free plasma      ext 24 hr ur metanephrine      ext 24 hr ur normetanephrine      ext normetanephrine      ext dopamine      EXT NOREPINEPHRINE      ext hcv rna quant pcr     NEURO ENDO LABS   Result Value Ref Range    EXT AFP      EXT CEA      EXT CA 19-9      EXT CA 27-29      EXT       EXT CYCLOSPORONE LEVEL      EXT PROGRAF LEVEL      EXT RAPAMUNE LEVEL      EXT 5 HIAA 24 HR URINE      EXT 5 HIAA BLOOD      EXT GASTRIN      EXT SEROTONIN      EXT CHROMOGRANIN A      EXT PANCREASTATIN BROWN      EXT PANCREASTATIN CAMB      EXT NEUROKININ A BROWN      EXT NEUROKININ A CAMB      EXT OCTREOTIDE LEVEL      EXT LANREOTIDE LEVEL      EXT ANTI PARIETAL CELL AB      EXT ANTI THYROID AB      EXT ANTI ISLET CELL AB      EXT Folate      EXT VITAMIN B12      EXT GLUCAGON      EXT PANCREATIC POLYPEPTIDE      EXT VASOACTIVE INTESTINAL POLYPEPTIDE      EXT C PEPTIDE      EXT INSULIN      EXT  SOMATOSTATIN      EXT SUBSTANCE P      EXT NEUROTENSIN      EXT CALCITONIN      EXT HISTAMINE      EXT TRYPTASE      EXT GASTRIN RELEASING PEPTIDE      EXT PTH, Intact      EXT FSH      EXT PROLACTIN      EXT MOTILIN      EXT GHRELIN      EXT IGF-1      EXT GROWTH HORMONE      EXT ACTH      EXT GASTRIN RELEASING PEPTIDE      EXT VMA 24 HR URINE      EXT HISTAMINE 24 HR URINE      EXT FREE T4      EXT FREE T3      EXT TSH      EXT 25 HYDROXY VIT D2      EXT 25 HYDROXY VIT D3      EXT WBC      EXT Hemoglobin      EXT Hematocrit      EXT Platelets      EXT PT      EXT PTT      EXT INR      EXT Glucose      EXT BUN      EXT Creatinine      EXT Sodium      EXT Potassium      EXT Chloride      EXT CO2      EXT Calcium      EXT Protein total      EXT Phosphorus      EXT Albumin      EXT Uric Acid      EXT BilirubiN Total      EXT Bilirubin Direct      EXT Alkaline Phosphatase      EXT GGT      EXT AST      EXT ALT      EXT Magnesium      EXT Lipase      EXT Amylase      EXT LDH, Total 53.6 0 - 100 mg/dl    EXT Triglycerides 185 0 - 200 mg/dl    EXT Cholesterol      EXT HDL 24.4 mg/dl    EXT LDL Cholesterol      EXT URINE AMYLASE U/HR      EXT URINE AMYLASE U/L      EXT Protein UA      EXT CREATININE UA      EXT Immunknow (stimulated)      EXT Immunknow (non-stimulated)      EXT EBV DNA by PCR      EXT BK Virus DNA QN PCR      EXT Hemoglobin A1C      Neuron Specific Enolase      EXT NSE      ext epinephrine      ext 24 hr ur normetanephrine      ext metanephrine free plasma      ext 24 hr ur metanephrine      ext 24 hr ur normetanephrine      ext normetanephrine      ext dopamine      EXT NOREPINEPHRINE      ext hcv rna quant pcr         Scans:   CT Chest Abdoment Pelvis Without Contrast (XPD)  Narrative: EXAMINATION:  CT CHEST ABDOMEN PELVIS WITHOUT CONTRAST(XPD)    CLINICAL HISTORY:  Other specified disorders of kidney and ureter.  Renal mass;    TECHNIQUE:  Axial CT imaging was performed through the chest, abdomen and  pelvis without intravenous contrast. Multiplanar reformats were performed and interpreted.    COMPARISON:  MRI abdomen on 10/11/2019    FINDINGS:  The evaluation is limited without intravenous contrast.    Chest:    The heart, great vessels, and mediastinal structures are within normal limits. There is a mildly enlarged anterior mediastinal/prevascular lymph node measuring 1.5 x 1.2 cm on axial image 51 of series 2.  No other sites of lymphadenopathy detected.    The lungs are clear bilaterally. No pleural effusions.  Status post CABG.  Aortic atherosclerosis and coronary artery calcifications.    Abdomen:    Redemonstration of multiple bilateral renal masses, unchanged from the previous MRI, difficult to further characterize due to lack of intravenous contrast.  And exophytic cystic lesion arising from the posterior interpolar region of the right kidney contains peripheral wall calcifications.    The liver, spleen, adrenal glands, and pancreas are within normal limits.    Cholelithiasis.  No biliary ductal dilatation.    No free fluid, free air, or inflammatory change.    The small bowel is grossly normal.  Colonic diverticulosis.  The appendix is normal.    Aortic atherosclerosis without evidence of aneurysm.    Pelvis:    The urinary bladder is unremarkable. Prostate is within normal limits.  No free pelvic fluid or adenopathy.    No significant osseous abnormality is identified.  Impression: Bilateral renal masses suboptimally characterized without intravenous contrast.  No significant change compared to the previous MRI performed on 10/11/2019.    Mildly enlarged indeterminate pre-vascular intrathoracic lymph node.    Colonic diverticulosis.    Cholelithiasis.    All CT scans at this facility are performed  using dose modulation techniques as appropriate to performed exam including the following:  automated exposure control; adjustment of mA and/or kV according to the patients size (this includes techniques or  standardized protocols for targeted exams where dose is matched to indication/reason for exam: i.e. extremities or head);  iterative reconstruction technique.    Electronically signed by: Farhan Noland MD  Date:    11/14/2019  Time:    10:26         Plan:       This is a 74-year-old gentleman who is found to have a rad skin condition with an association to the renal disease.  He underwent ultrasound of the kidney for evaluation and found to have a cystic mass further evaluation showed a heterogeneous mass on the right kidney with the intraductal papillary mucinous neoplasm of the pancreas.    I do not have any of those reports.  I do not know his basic functions.  Will he is status post coronary bypass surgery has not had any cardiac echo apparently according to the family since surgery. He just had a EKG prior to the endoscopic ultrasound.  Will obtain a cardiac echo.  Next for the going out of town tomorrow on coming back this week and I will see him again on Monday with all the workup.  I briefly talked about the surgery the intensity of the surgery. I talked about this premalignant condition of this cystic lesion in the pancreas.  If he is to undergo surgery his kidney also will be addressed at the same time.  He also has gallstones which also will be addressed at the same time I also explained him about the major risk of surgery with associated morbidity and complications.    Will discuss more once I have the reports.  I will also obtain a copy of the endoscopic ultrasound report from [facility]      Plan:  Follow-up on Monday with basic labs and cardiac echo          AMEYA Marks MD, FACS   Associate Professor of Surgery, [facility]   Neuroendocrine Surgery, Hepatic/Pancreatic & General Surgery   [address]   [place] [address]   ph. [phone]; [phone]   fax. [phone]

## 2019-11-19 ENCOUNTER — HOSPITAL ENCOUNTER (OUTPATIENT)
Dept: CARDIOLOGY | Facility: HOSPITAL | Age: 74
Discharge: HOME | End: 2019-11-19
Attending: SURGERY
Payer: COMMERCIAL

## 2019-11-19 DIAGNOSIS — N28.89 RENAL MASS: ICD-10-CM

## 2019-11-19 LAB
AORTIC ROOT ANNULUS: 3.74 CM
ASCENDING AORTA: 2.84 CM
AV INDEX (PROSTH): 0.73
AV MEAN GRADIENT: 3 MMHG
AV PEAK GRADIENT: 4 MMHG
AV VALVE AREA: 2.71 CM2
AV VELOCITY RATIO: 0.8
CV ECHO LV RWT: 0.39 CM
DOP CALC AO PEAK VEL: 1.06 M/S
DOP CALC AO VTI: 32.89 CM
DOP CALC LVOT AREA: 3.7 CM2
DOP CALC LVOT DIAMETER: 2.18 CM
DOP CALC LVOT PEAK VEL: 0.85 M/S
DOP CALC LVOT STROKE VOLUME: 89.09 CM3
DOP CALCLVOT PEAK VEL VTI: 23.88 CM
E WAVE DECELERATION TIME: 287.05 MSEC
E/A RATIO: 1.26
E/E' RATIO: 29.78 M/S
ECHO LV POSTERIOR WALL: 1.1 CM (ref 0.6–1.1)
FRACTIONAL SHORTENING: 20 % (ref 28–44)
INTERVENTRICULAR SEPTUM: 0.9 CM (ref 0.6–1.1)
LA MAJOR: 4.51 CM
LA MINOR: 4.87 CM
LA WIDTH: 3.79 CM
LEFT ATRIUM SIZE: 4.31 CM
LEFT ATRIUM VOLUME: 65.02 CM3
LEFT INTERNAL DIMENSION IN SYSTOLE: 4.57 CM (ref 2.1–4)
LEFT VENTRICLE DIASTOLIC VOLUME: 166.74 ML
LEFT VENTRICLE SYSTOLIC VOLUME: 95.63 ML
LEFT VENTRICULAR INTERNAL DIMENSION IN DIASTOLE: 5.7 CM (ref 3.5–6)
LEFT VENTRICULAR MASS: 226.35 G
LV LATERAL E/E' RATIO: 26.8 M/S
LV SEPTAL E/E' RATIO: 33.5 M/S
MV PEAK A VEL: 1.06 M/S
MV PEAK E VEL: 1.34 M/S
PISA TR MAX VEL: 2.22 M/S
RA PRESSURE: 8 MMHG
RIGHT VENTRICULAR END-DIASTOLIC DIMENSION: 2.98 CM
STJ: 2.67 CM
TDI LATERAL: 0.05 M/S
TDI SEPTAL: 0.04 M/S
TDI: 0.05 M/S
TR MAX PG: 20 MMHG
TRICUSPID ANNULAR PLANE SYSTOLIC EXCURSION: 1.64 CM
TV REST PULMONARY ARTERY PRESSURE: 28 MMHG

## 2019-11-19 PROCEDURE — 93306 ECHO (CUPID ONLY): ICD-10-PCS | Mod: 26,,, | Performed by: INTERNAL MEDICINE

## 2019-11-19 PROCEDURE — 93306 TTE W/DOPPLER COMPLETE: CPT | Mod: 26,,, | Performed by: INTERNAL MEDICINE

## 2019-11-19 PROCEDURE — 63600175 PHARM REV CODE 636 W HCPCS: Performed by: INTERNAL MEDICINE

## 2019-11-19 PROCEDURE — C8929 TTE W OR WO FOL WCON,DOPPLER: HCPCS

## 2019-11-19 RX ADMIN — HUMAN ALBUMIN MICROSPHERES AND PERFLUTREN 0.11 MG: 10; .22 INJECTION, SOLUTION INTRAVENOUS at 08:11

## 2019-11-25 ENCOUNTER — OFFICE VISIT (OUTPATIENT)
Dept: NEUROLOGY | Facility: HOSPITAL | Age: 74
End: 2019-11-25
Attending: SURGERY
Payer: COMMERCIAL

## 2019-11-25 ENCOUNTER — TELEPHONE (OUTPATIENT)
Dept: SURGERY | Facility: CLINIC | Age: 74
End: 2019-11-25

## 2019-11-25 ENCOUNTER — CLINICAL SUPPORT (OUTPATIENT)
Dept: NEUROLOGY | Facility: HOSPITAL | Age: 74
End: 2019-11-25
Payer: COMMERCIAL

## 2019-11-25 ENCOUNTER — LAB VISIT (OUTPATIENT)
Dept: LAB | Facility: HOSPITAL | Age: 74
End: 2019-11-25
Attending: SURGERY
Payer: COMMERCIAL

## 2019-11-25 VITALS
BODY MASS INDEX: 38.86 KG/M2 | DIASTOLIC BLOOD PRESSURE: 62 MMHG | HEART RATE: 65 BPM | TEMPERATURE: 97 F | WEIGHT: 262.38 LBS | HEIGHT: 69 IN | SYSTOLIC BLOOD PRESSURE: 134 MMHG

## 2019-11-25 DIAGNOSIS — E44.0 MODERATE PROTEIN-CALORIE MALNUTRITION: ICD-10-CM

## 2019-11-25 DIAGNOSIS — D49.0 INTRADUCTAL PAPILLARY MUCINOUS NEOPLASM: Primary | ICD-10-CM

## 2019-11-25 DIAGNOSIS — C64.1 RENAL CELL CANCER, RIGHT: ICD-10-CM

## 2019-11-25 DIAGNOSIS — D49.0 INTRADUCTAL PAPILLARY MUCINOUS NEOPLASM: ICD-10-CM

## 2019-11-25 PROCEDURE — 99211 OFF/OP EST MAY X REQ PHY/QHP: CPT | Mod: 27

## 2019-11-25 PROCEDURE — 84156 ASSAY OF PROTEIN URINE: CPT

## 2019-11-25 PROCEDURE — 99213 OFFICE O/P EST LOW 20 MIN: CPT | Performed by: SURGERY

## 2019-11-25 NOTE — PATIENT INSTRUCTIONS
Dietary consult today at 10am with Tracie Weil.    2 week follow up scheduled on Monday, December 9, 2019 at 1140am.    Labs scheduled today..

## 2019-11-25 NOTE — TELEPHONE ENCOUNTER
11/25/2019        931  Attempted to contact patient regarding his appointment on 11/29/2019. No answer. Left voicemail.

## 2019-11-25 NOTE — PROGRESS NOTES
"NOLANETS:  [facility]  A collaboration between [facility] and [facility]      PATIENT: Javed Armijo  MRN: [id number]  DATE: 11/25/2019    Subjective:      Chief Complaint: No chief complaint on file.   He is back after the basic workup which includes CBC CMP cardiac echo.  His prealbumin is 18 although he states that he is eating well    He basically has no other complaints is feeling well overall  The whole family went in assisted use in [place].  He has recently had knees replaced and therefore he had some little difficulty in mobility otherwise he is doing well overall    Vitals: Blood pressure 134/62, pulse 65, temperature 96.6 °F (35.9 °C), temperature source Oral, height 5' 9" (1.753 m), weight 119 kg (262 lb 5.6 oz).     ECOG Score: 0 - Asymptomatic    Diagnosis: No diagnosis found.     Interval History:     Oncologic History:   Oncologic History    Oncologic Treatment    Pathology      Past Medical History:  Past Medical History:   Diagnosis Date    CKD (chronic kidney disease)     DM (diabetes mellitus)     HTN (hypertension)     Hyperlipidemia     Hypothyroidism     Kidney mass     TIFFANIE (obstructive sleep apnea)     on CPAP       Past Surgical History:  Past Surgical History:   Procedure Laterality Date    CORONARY ARTERY BYPASS GRAFT  06/2017    x2    MUSCLE REPAIR Right 10/2014    bicep    NASAL SEPTOPLASTY  10/2018    TOTAL KNEE ARTHROPLASTY  2010    UMBILICAL HERNIA REPAIR  12/2011       Family History:  History reviewed. No pertinent family history.    Allergies:  Lisinopril    Medications:   Current Outpatient Medications   Medication Sig    aspirin-calcium carbonate 81 mg-300 mg calcium(777 mg) Tab Take 81 mg by mouth.    atorvastatin calcium (LIPITOR ORAL) Take 20 mg by mouth.    carvedilol (COREG) 6.25 MG tablet Take 6.25 mg by mouth.    cyanocobalamin (VITAMIN B-12) 1000 MCG tablet Take " 1,000 mcg by mouth.    fenofibrate (TRICOR) 145 MG tablet Take by mouth.    folic acid (FOLVITE) 1 MG tablet Take 1 mg by mouth.    insulin (LANTUS SOLOSTAR U-100 INSULIN) glargine 100 units/mL (3mL) SubQ pen     insulin aspart U-100 (NOVOLOG FLEXPEN U-100 INSULIN) 100 unit/mL (3 mL) InPn pen     levothyroxine (SYNTHROID) 100 MCG tablet Take 100 mcg by mouth.    liraglutide 0.6 mg/0.1 mL, 18 mg/3 mL, subq PNIJ (VICTOZA 2-CITLALY) 0.6 mg/0.1 mL (18 mg/3 mL) PnIj 1.8 mg.    omega-3 fatty acids 100 mg Chew Take 4,000 mg by mouth.     No current facility-administered medications for this visit.         Review of Systems   Constitutional: Negative for activity change, chills, diaphoresis, fatigue and unexpected weight change.   HENT: Negative for congestion, drooling, ear discharge, ear pain, facial swelling, hearing loss, mouth sores, rhinorrhea, sinus pressure, sneezing and sore throat.    Eyes: Negative for photophobia, pain, discharge, redness, itching and visual disturbance.   Respiratory: Negative for choking, chest tightness, wheezing and stridor.    Cardiovascular: Negative for chest pain, palpitations and leg swelling.   Gastrointestinal: Negative for abdominal distention, diarrhea, nausea, rectal pain and vomiting.   Endocrine: Negative.    Genitourinary: Negative.    Musculoskeletal: Positive for arthralgias and back pain.   Skin: Positive for rash. Negative for color change, pallor and wound.   Allergic/Immunologic: Negative.    Neurological: Negative.  Negative for seizures, syncope, speech difficulty, weakness, light-headedness, numbness and headaches.   Hematological: Negative.  Does not bruise/bleed easily.   Psychiatric/Behavioral: Negative for confusion, dysphoric mood and suicidal ideas. The patient is not nervous/anxious and is not hyperactive.       Objective:      Physical Exam   Constitutional: He is oriented to person, place, and time. He appears well-developed and well-nourished.   generalized  rash in the lower extremity   HENT:   Head: Normocephalic.   Right Ear: External ear normal.   Left Ear: External ear normal.   Eyes: Pupils are equal, round, and reactive to light. Conjunctivae and EOM are normal.   Neck: Normal range of motion.   Cardiovascular: Normal rate and regular rhythm.   Pulmonary/Chest: Breath sounds normal. No respiratory distress.   Abdominal: Soft. Bowel sounds are normal. He exhibits no mass. There is no tenderness. There is no rebound and no guarding. No hernia.   Musculoskeletal: Normal range of motion.   Neurological: He is alert and oriented to person, place, and time.   Skin: Skin is warm and dry.   Psychiatric: He has a normal mood and affect. His behavior is normal.      Assessment:       No diagnosis found.    Laboratory Data:   Results for orders placed or performed during the hospital encounter of 11/19/19   Echo Color Flow Doppler? Yes; Bubble Contrast? No   Result Value Ref Range    TDI SEPTAL 0.04 m/s    LV LATERAL E/E' RATIO 26.80 m/s    LV SEPTAL E/E' RATIO 33.50 m/s    LA WIDTH 3.79 cm    TDI LATERAL 0.05 m/s    LVIDD 5.70 3.5 - 6.0 cm    IVS 0.90 (A) 0.6 - 1.1 cm    PW 1.10 0.6 - 1.1 cm    Ao root annulus 3.74 cm    LVIDS 4.57 (A) 2.1 - 4.0 cm    FS 20 28 - 44 %    LA volume 65.02 cm3    STJ 2.67 cm    Ascending aorta 2.84 cm    LV mass 226.35 g    LA size 4.31 cm    RVDD 2.98 cm    TAPSE 1.64 cm    Left Ventricle Relative Wall Thickness 0.39 cm    AV mean gradient 3 mmHg    AV valve area 2.71 cm2    AV Velocity Ratio 0.80     AV index (prosthetic) 0.73     E/A ratio 1.26     Mean e' 0.05 m/s    E wave decelartion time 287.05 msec    LVOT diameter 2.18 cm    LVOT area 3.7 cm2    LVOT peak ernesto 0.85 m/s    LVOT peak VTI 23.88 cm    Ao peak ernesto 1.06 m/s    Ao VTI 32.89 cm    LVOT stroke volume 89.09 cm3    AV peak gradient 4 mmHg    E/E' ratio 29.78 m/s    MV Peak E Ernesto 1.34 m/s    TR Max Ernesto 2.22 m/s    MV Peak A Ernesto 1.06 m/s    LV Systolic Volume 95.63 mL    LV  Diastolic Volume 166.74 mL    Left Atrium Minor Axis 4.87 cm    Left Atrium Major Axis 4.51 cm    Triscuspid Valve Regurgitation Peak Gradient 20 mmHg    Right Atrial Pressure (from IVC) 8 mmHg    TV rest pulmonary artery pressure 28 mmHg       Scans:   Echo Color Flow Doppler? Yes; Bubble Contrast? No  · Normal left ventricular systolic function. The estimated ejection   fraction is 55%  · Eccentric left ventricular hypertrophy.  · Mild left ventricular enlargement.  · Indeterminate left ventricular diastolic function.  · No wall motion abnormalities.  · Normal right ventricular systolic function.  · Intermediate central venous pressure (8 mm Hg).          Impression:  74-year-old gentleman gentleman who has this rash syndrome kyrle;s syndrome consisting of rash with other systemic features of renal dysfunction was evaluated for screening and was found to have renal lesion.  This is confirmed to be a renal cell cancer on the right side his multiple cyst on both sides and cystic structures all over the liver and other organs.  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 He has a renal dysfunction with a creatinine of 1.8.  He is status post a CABG done recently.  Overall he is taking care of his generalized day-to-day activities although not much ambulatory because of his choice.      He was accompanied by his wife on I clearly told him at this point he has 1 existing cancer is renal cell cancer which is not that aggressive and  it is mainly in the  Right kidney and the other is premalignant lesion in the head of the pancreas which is about 50% chance of being malignant already malignant.    He also has diabetes and hypertension.  His albumin is 3.6.  So will check the urine protein creatinine ratio to make show that his low protein  is not from albuminuria.  His wife states that he is eating well however he may not be eating the most healthy food.  He he will be seeing the dietitian today and will talk to him over good nutrition.  Will re-evaluate his nutrition in about 2 weeks time    I also discussed the care kidney findings with the radiologist.  The kidney lesion is very close the duodenum and therefore ablation is difficult although not impossible    I briefly talked to the family about the surgery and the risk of surgery such as bleeding infection DVT PE MI stroke risk of dialysis pancreatic leaks requiring more procedures prolonged hospitalization.  Plan:       Obtain urine studies  Consult dietitian  Follow-up in 2 weeks time to reassess his nutritional status     I talked to them lab greatly for a long time they were able to comprehend and answered all the questions.        AMEYA Marks MD, FACS   Associate Professor of Surgery, [facility]   Neuroendocrine Surgery, Hepatic/Pancreatic & General Surgery   [address], [address]   [place] [address]   ph. [phone]; [phone]   fax. [phone]

## 2019-11-27 ENCOUNTER — TELEPHONE (OUTPATIENT)
Dept: NEUROLOGY | Facility: HOSPITAL | Age: 74
End: 2019-11-27

## 2019-11-27 ENCOUNTER — LAB VISIT (OUTPATIENT)
Dept: LAB | Facility: HOSPITAL | Age: 74
End: 2019-11-27
Attending: SURGERY
Payer: COMMERCIAL

## 2019-11-27 DIAGNOSIS — C64.1 RENAL CELL CANCER, RIGHT: ICD-10-CM

## 2019-11-27 DIAGNOSIS — D49.0 INTRADUCTAL PAPILLARY MUCINOUS NEOPLASM: ICD-10-CM

## 2019-11-27 DIAGNOSIS — E44.0 MODERATE PROTEIN-CALORIE MALNUTRITION: ICD-10-CM

## 2019-11-27 LAB
CREAT UR-MCNC: 86.1 MG/DL (ref 23–375)
PROT 24H UR-MRATE: 180 MG/SPEC (ref 0–100)
PROT UR-MCNC: 8 MG/DL (ref 0–15)
PROT UR-MCNC: 8 MG/DL (ref 0–15)
PROT/CREAT UR: 0.09 MG/G{CREAT} (ref 0–0.2)
URINE COLLECTION DURATION: 24 HR
URINE VOLUME: 2250 ML

## 2019-11-27 PROCEDURE — 82570 ASSAY OF URINE CREATININE: CPT

## 2019-12-09 ENCOUNTER — OFFICE VISIT (OUTPATIENT)
Dept: NEUROLOGY | Facility: HOSPITAL | Age: 74
End: 2019-12-09
Attending: SURGERY
Payer: COMMERCIAL

## 2019-12-09 VITALS
HEART RATE: 58 BPM | BODY MASS INDEX: 37.56 KG/M2 | SYSTOLIC BLOOD PRESSURE: 127 MMHG | TEMPERATURE: 97 F | WEIGHT: 253.63 LBS | HEIGHT: 69 IN | DIASTOLIC BLOOD PRESSURE: 65 MMHG

## 2019-12-09 DIAGNOSIS — N18.4 CHRONIC RENAL DISEASE, STAGE 4, SEVERELY DECREASED GLOMERULAR FILTRATION RATE BETWEEN 15-29 ML/MIN/1.73 SQUARE METER: ICD-10-CM

## 2019-12-09 DIAGNOSIS — D49.0 IPMN (INTRADUCTAL PAPILLARY MUCINOUS NEOPLASM): ICD-10-CM

## 2019-12-09 DIAGNOSIS — C64.1 RENAL CELL ADENOCARCINOMA, RIGHT: Primary | ICD-10-CM

## 2019-12-09 DIAGNOSIS — K81.1 CHRONIC CHOLECYSTITIS: ICD-10-CM

## 2019-12-09 PROCEDURE — 99213 OFFICE O/P EST LOW 20 MIN: CPT | Performed by: SURGERY

## 2019-12-09 RX ORDER — LIDOCAINE HYDROCHLORIDE 10 MG/ML
1 INJECTION, SOLUTION EPIDURAL; INFILTRATION; INTRACAUDAL; PERINEURAL ONCE
Status: SHIPPED | OUTPATIENT
Start: 2019-12-09

## 2019-12-09 RX ORDER — SODIUM CHLORIDE 9 MG/ML
INJECTION, SOLUTION INTRAVENOUS CONTINUOUS
Status: CANCELLED | OUTPATIENT
Start: 2019-12-09

## 2019-12-09 NOTE — PROGRESS NOTES
NOLANETS:  [facility]  A collaboration between [facility] and [facility]      PATIENT: Javed Armijo  MRN: [id number]  DATE: 12/9/2019    Subjective:      Chief Complaint: 2 week follow up  Doing okay.  He has lost about 5 lb since the change in diet.  He underwent some of the investigations ordered by me.    Vitals: There were no vitals taken for this visit. --  ECOG Score: 0 - Asymptomatic    Diagnosis: No diagnosis found.     Interval History:     Oncologic History:   Oncologic History    Oncologic Treatment    Pathology      Past Medical History:  Past Medical History:   Diagnosis Date    CKD (chronic kidney disease)     DM (diabetes mellitus)     HTN (hypertension)     Hyperlipidemia     Hypothyroidism     Kidney mass     TIFFANIE (obstructive sleep apnea)     on CPAP       Past Surgical History:  Past Surgical History:   Procedure Laterality Date    CORONARY ARTERY BYPASS GRAFT  06/2017    x2    MUSCLE REPAIR Right 10/2014    bicep    NASAL SEPTOPLASTY  10/2018    TOTAL KNEE ARTHROPLASTY  2010    UMBILICAL HERNIA REPAIR  12/2011       Family History:  No family history on file.    Allergies:  Lisinopril    Medications:   Current Outpatient Medications   Medication Sig    aspirin-calcium carbonate 81 mg-300 mg calcium(777 mg) Tab Take 81 mg by mouth.    atorvastatin calcium (LIPITOR ORAL) Take 20 mg by mouth.    carvedilol (COREG) 6.25 MG tablet Take 6.25 mg by mouth.    cyanocobalamin (VITAMIN B-12) 1000 MCG tablet Take 1,000 mcg by mouth.    fenofibrate (TRICOR) 145 MG tablet Take by mouth.    folic acid (FOLVITE) 1 MG tablet Take 1 mg by mouth.    insulin (LANTUS SOLOSTAR U-100 INSULIN) glargine 100 units/mL (3mL) SubQ pen     insulin aspart U-100 (NOVOLOG FLEXPEN U-100 INSULIN) 100 unit/mL (3 mL) InPn pen     levothyroxine (SYNTHROID) 100 MCG tablet Take 100 mcg by mouth.    liraglutide 0.6 mg/0.1  mL, 18 mg/3 mL, subq PNIJ (VICTOZA 2-CITLALY) 0.6 mg/0.1 mL (18 mg/3 mL) PnIj 1.8 mg.    omega-3 fatty acids 100 mg Chew Take 4,000 mg by mouth.     No current facility-administered medications for this visit.         Review of Systems   Constitutional: Negative for activity change, appetite change, chills, diaphoresis, fatigue, fever and unexpected weight change.   HENT: Negative for ear pain, facial swelling, hearing loss, postnasal drip, sinus pain, sneezing, sore throat and tinnitus.    Eyes: Negative for pain, discharge, itching and visual disturbance.   Respiratory: Negative for cough, choking, chest tightness, shortness of breath, wheezing and stridor.    Cardiovascular: Positive for leg swelling. Negative for chest pain.   Gastrointestinal: Negative for abdominal distention, abdominal pain, anal bleeding, blood in stool, constipation, diarrhea, nausea and vomiting.   Endocrine: Negative.    Genitourinary: Negative.    Musculoskeletal: Negative for joint swelling and neck pain.   Skin: Positive for rash.   Allergic/Immunologic: Negative.    Neurological: Negative for dizziness, tremors, syncope, speech difficulty, weakness, numbness and headaches.   Hematological: Negative for adenopathy. Does not bruise/bleed easily.   Psychiatric/Behavioral: Negative for behavioral problems and confusion.      Objective:      Physical Exam   Constitutional: He is oriented to person, place, and time. He appears well-developed and well-nourished.   HENT:   Head: Normocephalic and atraumatic.   Right Ear: External ear normal.   Left Ear: External ear normal.   Eyes: Pupils are equal, round, and reactive to light. Conjunctivae are normal.   Neck: Normal range of motion.   Cardiovascular: Normal rate and regular rhythm.   Pulmonary/Chest: Effort normal and breath sounds normal.   Chest incision noted from his previous bypass surgery   Abdominal: Soft. There is no rebound. No hernia.   Musculoskeletal: Normal range of motion.    Neurological: He is alert and oriented to person, place, and time.   Skin: Skin is warm and dry.   Psychiatric: He has a normal mood and affect. His behavior is normal.      Assessment:       No diagnosis found.    Laboratory Data:       Scans:   Echo Color Flow Doppler? Yes; Bubble Contrast? No  · Normal left ventricular systolic function. The estimated ejection   fraction is 55%  · Eccentric left ventricular hypertrophy.  · Mild left ventricular enlargement.  · Indeterminate left ventricular diastolic function.  · No wall motion abnormalities.  · Normal right ventricular systolic function.  · Intermediate central venous pressure (8 mm Hg).          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    I talked about the premalignant condition of the lesion.  He underwent previous urine protein test as he was found to have low protein and pre-albumin level.  The he is spilling some protein but not significant.  His last creatinine is 1.8.  The plan will be to proceed with exploration the pancreas possible central pancreatectomy with Colace lithiasis and ablation of the renal cell lesion on the right side.    Continue weight loss continue physical activities    Plan:       Will check his liver function tests again and prealbumin level.  Plan to see him a 2nd week  Of January I talked to them about the the risks and benefits of the surgery.  I talked about the the risk of missing early cancer and the dura and the issues of surveillance of pancreatic cancer.  I clearly explained to him the complications of these major procedure versus the downside of missing a cancer which could have been completely resected.  The she he was accompanied by his wife the all the have understood the the reasoning behind the surgery. Also clearly told him he is at  high risk because of his recent cardiac bypass, renal dysfunction with a creatinine of 1.8.  I also told him about the high risk nature of this surgery he had need to be prepared for Whipple and if central pancreatectomy is possible it would be advantages even though the risks are higher    Plan to re evaluate him after Kandi holidays          AMEYA Marks MD, FACS   Associate Professor of Surgery, [facility]   Neuroendocrine Surgery, Hepatic/Pancreatic & General Surgery   [address]   [place] [address]   ph. [phone]; [phone]   fax. [phone]

## 2019-12-09 NOTE — PATIENT INSTRUCTIONS
Your next appointment with Dr. Birch is 1-9-20 at 1000 am      You will receive a call from the office to let you know if you can eat or drink before labs to be drawn on 1/9/20

## 2019-12-27 NOTE — PROGRESS NOTES
[place]:  [facility]  A collaboration between [facility] and [facility]      PATIENT: Javed Armijo  MRN: [id number]  DATE: 12/27/2019    Subjective:      Chief Complaint: pre op visit     Mr. Armijo states that he is doing well, eating well physically active however because of his [place] holidays and this time with the family he could not sustain his weight loss.  Next for overall he feels good.  His generalized rash has improved.    After walking a block, he gets uncomfortable because of his joint issues otherwise he does not have any shortness of breath.  His diabetes and was under control        Vitals: There were no vitals taken for this visit.   ECOG Score: 0 - Asymptomatic    Diagnosis: No diagnosis found.     Interval History:   Will check his liver function tests again and prealbumin level.   January I talked to them about the the risks and benefits of the surgery.  I talked about the the risk of missing early cancer and the dura and the issues of surveillance of pancreatic cancer.  I clearly explained to him the complications of these major procedure versus the downside of missing a cancer which could have been completely resected.   Also clearly told him he is at high risk because of his recent cardiac bypass, renal dysfunction with a creatinine of 1.8.  I also told him about the high risk nature of this surgery he had need to be prepared for Whipple and if central pancreatectomy is possible it would be advantages even though the risks are higher    Oncologic History:   Oncologic History 10/2019 suspected renal carcinoma per MRI scan   Oncologic Treatment    Pathology      Past Medical History:  Past Medical History:   Diagnosis Date    CKD (chronic kidney disease)     DM (diabetes mellitus)     HTN (hypertension)     Hyperlipidemia     Hypothyroidism     Kidney mass     TIFFANIE (obstructive sleep apnea)      on CPAP       Past Surgical History:  Past Surgical History:   Procedure Laterality Date    CORONARY ARTERY BYPASS GRAFT  06/2017    x2    MUSCLE REPAIR Right 10/2014    bicep    NASAL SEPTOPLASTY  10/2018    TOTAL KNEE ARTHROPLASTY  2010    UMBILICAL HERNIA REPAIR  12/2011       Family History:  No family history on file.    Allergies:  Lisinopril    Medications:   Current Outpatient Medications   Medication Sig    aspirin-calcium carbonate 81 mg-300 mg calcium(777 mg) Tab Take 81 mg by mouth.    atorvastatin calcium (LIPITOR ORAL) Take 20 mg by mouth.    carvedilol (COREG) 6.25 MG tablet Take 6.25 mg by mouth.    cyanocobalamin (VITAMIN B-12) 1000 MCG tablet Take 1,000 mcg by mouth.    fenofibrate (TRICOR) 145 MG tablet Take by mouth.    folic acid (FOLVITE) 1 MG tablet Take 1 mg by mouth.    insulin (LANTUS SOLOSTAR U-100 INSULIN) glargine 100 units/mL (3mL) SubQ pen     insulin aspart U-100 (NOVOLOG FLEXPEN U-100 INSULIN) 100 unit/mL (3 mL) InPn pen     levothyroxine (SYNTHROID) 100 MCG tablet Take 100 mcg by mouth.    liraglutide 0.6 mg/0.1 mL, 18 mg/3 mL, subq PNIJ (VICTOZA 2-CITLALY) 0.6 mg/0.1 mL (18 mg/3 mL) PnIj 1.8 mg.    omega-3 fatty acids 100 mg Chew Take 4,000 mg by mouth.     Current Facility-Administered Medications   Medication    lidocaine (PF) 10 mg/ml (1%) injection 10 mg        Review of Systems   Constitutional: Negative for activity change, appetite change, chills, diaphoresis, fever and unexpected weight change.   HENT: Negative for congestion, dental problem, drooling, ear discharge, ear pain, hearing loss, mouth sores, nosebleeds, rhinorrhea, sneezing, sore throat, tinnitus, trouble swallowing and voice change.    Eyes: Negative for photophobia, pain, discharge, redness, itching and visual disturbance.   Respiratory: Negative for apnea, cough, choking, chest tightness, shortness of breath, wheezing and stridor.    Gastrointestinal: Negative for abdominal distention, anal  bleeding, constipation, diarrhea, nausea, rectal pain and vomiting.   Endocrine: Negative.    Genitourinary: Negative.    Musculoskeletal: Positive for arthralgias and joint swelling. Negative for gait problem, neck pain and neck stiffness.   Skin: Positive for color change and rash. Negative for pallor and wound.   Allergic/Immunologic: Negative.    Neurological: Negative for dizziness, tremors, syncope, facial asymmetry, speech difficulty and headaches.   Hematological: Negative for adenopathy. Does not bruise/bleed easily.   Psychiatric/Behavioral: Negative for agitation, behavioral problems and confusion.      Objective:      Physical Exam   Constitutional: He is oriented to person, place, and time. He appears well-developed and well-nourished. No distress.   HENT:   Head: Normocephalic and atraumatic.   Right Ear: External ear normal.   Eyes: Pupils are equal, round, and reactive to light. Conjunctivae are normal.   Neck: Normal range of motion.   Cardiovascular: Normal rate and regular rhythm.   Pulmonary/Chest: Effort normal and breath sounds normal.   Abdominal: Soft. Bowel sounds are normal. He exhibits no mass. There is no tenderness. There is no rebound. A hernia is present.   Epigastric incisional hernia, reducible   Musculoskeletal: Normal range of motion.   Neurological: He is alert and oriented to person, place, and time.   Skin: Skin is warm. He is not diaphoretic.   Psychiatric: He has a normal mood and affect. His behavior is normal.      Assessment:       No diagnosis found.    Laboratory Data: Neuro Endo Labs 3/14/19       EXT LDH, Total 0 - 100 mg/dl 53.6    EXT Triglycerides 0 - 200 mg/dl 185    EXT Cholesterol     EXT HDL mg/dl 24.4    8/22/19  EXT BilirubiN Total 0.1 - 1.3 mg/dl 0.9    10/26/19  EXT Glucose 70 - 110 mg/dl 202Abnormal      10/9/19  EXT WBC 4.8 - 10.8 k/ul 4.5Abnormal     EXT Hemoglobin 14.0 - 18.0 g/dl 10.9Abnormal     EXT Hematocrit 42.0 - 52.0 % 32.5Abnormal     EXT  Platelets 140 - 420 k/ul 223           Scans:   Echo Color Flow Doppler? Yes; Bubble Contrast? No  · Normal left ventricular systolic function. The estimated ejection   fraction is 55%  · Eccentric left ventricular hypertrophy.  · Mild left ventricular enlargement.  · Indeterminate left ventricular diastolic function.  · No wall motion abnormalities.  · Normal right ventricular systolic function.  · Intermediate central venous pressure (8 mm Hg).          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  Subsequently underwent CT scan MRI on was found to have a renal cell carcinoma about 3 x 3 cm on the right upper pole and also pancreatic cystic lesions of the head of the pancreas.  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 He is status post a coronary artery bypass surgery about 2 years back.    I saw him a few weeks back.  He had  cardiac echo done which showed excellent cardiac function.  He was also found have  low aerum albumin and  creatinine of 1.7.  I.did w/u proteinuria.  He has mild proteinuria which would not explain his low albumin and prealbumin status.  His oral intake is good however his prealbumin level was low.  He was encouraged to have nutritional intake.    I had a long discussion with him and his wife about his overall condition. 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  I also talked about the the surgery for the pancreas which has high morbidity and mortality.  I talked about the prophylactic surgery which carries 5% mortality in  about 40% morbidity which has about 20-40% chance of being male ligament. Also he has minimal cancer which is of the right kidney needs to be had addressed.    B sites this he also has a chronic renal function because of his ultrasound Will dominant condition and also he because of the multiple cysts in his kidneys.  I at that time talked to him about the possibility of prolonged hospitalization the chance of requiring long-term dialysis except.  After a long discussion he wanted to wait until the [place] vacation and holidays all over and he can't spent time with his family.    I his back here he wanted to proceed with surgery today I spent a lot of time talking to him about his condition of premalignant pancreatic lesion and then cell carcinoma with chronic renal insufficiency.  I talked about the risk of bleeding infection DVT PE MI stroke proteinuria prolonged hospitalization the chance of requiring long-term dialysis, delayed gastric emptying requiring feeling tubes.  I emphasized several times the purpose of pancreatic surgeries prophylactic however the chance of being transformation to malignancies about 20-40% chance.    I also given the option of proceeding with the renal cell carcinoma ablation and then repeating MRIs periodically to assess the pancreatic lesion.  He also has several stones in the gallbladder which will be addressed at the time of surgery.    After discussion for a long time he wanted to proceed with surgery. I requested dietitian to come and talk to them and they were given instruction to start dietary supplement 1 week before the surgery to enhance the nutritional status.  The plan will be to proceed with the central pancreatectomy are Whipple with or without renal ablation.  Benefits and risk clearly explained and consent obtained.        Plan:       For explor laparotomy on 01/21/2020  Preop anesthesia consult pending  NPO after Monday midnight on 01/20  Clear liquids from Monday  morning on 01/20        AMEYA Marks MD, FACS   Associate Professor of Surgery, [facility]   Neuroendocrine Surgery, Hepatic/Pancreatic & General Surgery   [address], [address]   [place] [address]   ph. [phone]; [phone]   fax. [phone]

## 2020-01-02 ENCOUNTER — TELEPHONE (OUTPATIENT)
Dept: NEUROLOGY | Facility: HOSPITAL | Age: 75
End: 2020-01-02

## 2020-01-02 DIAGNOSIS — Z01.818 PRE-OP EVALUATION: Primary | ICD-10-CM

## 2020-01-08 ENCOUNTER — TELEPHONE (OUTPATIENT)
Dept: NEUROLOGY | Facility: HOSPITAL | Age: 75
End: 2020-01-08

## 2020-01-08 DIAGNOSIS — C64.1 RENAL CELL CANCER, RIGHT: Primary | ICD-10-CM

## 2020-01-08 NOTE — TELEPHONE ENCOUNTER
----- Message from Agata Luna sent at 1/8/2020  8:54 AM CST -----  Contact: Wife Francesca [phone]  MARIA ESTHER  Patient's wife is requesting to speak with nurse to ask if pt has to fast for lab work tomorrow. Please advise.

## 2020-01-08 NOTE — TELEPHONE ENCOUNTER
Called pts wife and told her that as soon as I hear from Dr. Birch the lab test for tomorrow I will call her back and let  Her know if this pt needs to be npo

## 2020-01-08 NOTE — TELEPHONE ENCOUNTER
Called pt wife to inform them that he needs to be NPO for labs that have been scheduled for 920 am. ALEM

## 2020-01-09 ENCOUNTER — CLINICAL SUPPORT (OUTPATIENT)
Dept: NEUROLOGY | Facility: HOSPITAL | Age: 75
End: 2020-01-09
Payer: COMMERCIAL

## 2020-01-09 ENCOUNTER — OFFICE VISIT (OUTPATIENT)
Dept: NEUROLOGY | Facility: HOSPITAL | Age: 75
End: 2020-01-09
Attending: SURGERY
Payer: COMMERCIAL

## 2020-01-09 ENCOUNTER — LAB VISIT (OUTPATIENT)
Dept: LAB | Facility: HOSPITAL | Age: 75
End: 2020-01-09
Attending: SURGERY
Payer: COMMERCIAL

## 2020-01-09 VITALS
HEART RATE: 56 BPM | DIASTOLIC BLOOD PRESSURE: 64 MMHG | SYSTOLIC BLOOD PRESSURE: 119 MMHG | WEIGHT: 252.88 LBS | TEMPERATURE: 97 F | BODY MASS INDEX: 37.45 KG/M2 | HEIGHT: 69 IN

## 2020-01-09 DIAGNOSIS — Z01.818 PRE-OP EVALUATION: ICD-10-CM

## 2020-01-09 DIAGNOSIS — N28.89 RENAL MASS: ICD-10-CM

## 2020-01-09 DIAGNOSIS — N18.30 CHRONIC RENAL INSUFFICIENCY, STAGE 3 (MODERATE): ICD-10-CM

## 2020-01-09 DIAGNOSIS — C64.1 RENAL CELL CANCER, RIGHT: ICD-10-CM

## 2020-01-09 DIAGNOSIS — K80.10 CALCULUS OF GALLBLADDER WITH CHRONIC CHOLECYSTITIS WITHOUT OBSTRUCTION: ICD-10-CM

## 2020-01-09 DIAGNOSIS — D49.0 IPMN (INTRADUCTAL PAPILLARY MUCINOUS NEOPLASM): Primary | ICD-10-CM

## 2020-01-09 DIAGNOSIS — C64.1 RENAL CELL CARCINOMA OF RIGHT KIDNEY: ICD-10-CM

## 2020-01-09 LAB
ALBUMIN SERPL BCP-MCNC: 3.6 G/DL (ref 3.5–5.2)
ALP SERPL-CCNC: 47 U/L (ref 55–135)
ALT SERPL W/O P-5'-P-CCNC: 24 U/L (ref 10–44)
ANION GAP SERPL CALC-SCNC: 10 MMOL/L (ref 8–16)
AST SERPL-CCNC: 23 U/L (ref 10–40)
BASOPHILS # BLD AUTO: 0.04 K/UL (ref 0–0.2)
BASOPHILS NFR BLD: 0.7 % (ref 0–1.9)
BILIRUB SERPL-MCNC: 0.4 MG/DL (ref 0.1–1)
BUN SERPL-MCNC: 35 MG/DL (ref 8–23)
CALCIUM SERPL-MCNC: 9.4 MG/DL (ref 8.7–10.5)
CHLORIDE SERPL-SCNC: 101 MMOL/L (ref 95–110)
CO2 SERPL-SCNC: 29 MMOL/L (ref 23–29)
CREAT SERPL-MCNC: 2.1 MG/DL (ref 0.5–1.4)
DIFFERENTIAL METHOD: ABNORMAL
EOSINOPHIL # BLD AUTO: 0.6 K/UL (ref 0–0.5)
EOSINOPHIL NFR BLD: 9.6 % (ref 0–8)
ERYTHROCYTE [DISTWIDTH] IN BLOOD BY AUTOMATED COUNT: 14.6 % (ref 11.5–14.5)
EST. GFR  (AFRICAN AMERICAN): 35 ML/MIN/1.73 M^2
EST. GFR  (NON AFRICAN AMERICAN): 31 ML/MIN/1.73 M^2
GLUCOSE SERPL-MCNC: 155 MG/DL (ref 70–110)
HCT VFR BLD AUTO: 39.5 % (ref 40–54)
HGB BLD-MCNC: 12.1 G/DL (ref 14–18)
INR PPP: 1.1 (ref 0.8–1.2)
LYMPHOCYTES # BLD AUTO: 1.1 K/UL (ref 1–4.8)
LYMPHOCYTES NFR BLD: 18.5 % (ref 18–48)
MCH RBC QN AUTO: 28.8 PG (ref 27–31)
MCHC RBC AUTO-ENTMCNC: 30.6 G/DL (ref 32–36)
MCV RBC AUTO: 94 FL (ref 82–98)
MONOCYTES # BLD AUTO: 0.6 K/UL (ref 0.3–1)
MONOCYTES NFR BLD: 9.2 % (ref 4–15)
NEUTROPHILS # BLD AUTO: 3.8 K/UL (ref 1.8–7.7)
NEUTROPHILS NFR BLD: 62 % (ref 38–73)
PLATELET # BLD AUTO: 197 K/UL (ref 150–350)
PMV BLD AUTO: 10.9 FL (ref 9.2–12.9)
POTASSIUM SERPL-SCNC: 4.3 MMOL/L (ref 3.5–5.1)
PREALB SERPL-MCNC: 18 MG/DL (ref 20–43)
PROT SERPL-MCNC: 7.1 G/DL (ref 6–8.4)
PROTHROMBIN TIME: 12.4 SEC (ref 9–12.5)
RBC # BLD AUTO: 4.2 M/UL (ref 4.6–6.2)
SODIUM SERPL-SCNC: 140 MMOL/L (ref 136–145)
WBC # BLD AUTO: 6.12 K/UL (ref 3.9–12.7)

## 2020-01-09 PROCEDURE — 85610 PROTHROMBIN TIME: CPT

## 2020-01-09 PROCEDURE — 99211 OFF/OP EST MAY X REQ PHY/QHP: CPT | Mod: 27

## 2020-01-09 PROCEDURE — 36415 COLL VENOUS BLD VENIPUNCTURE: CPT

## 2020-01-09 PROCEDURE — 84134 ASSAY OF PREALBUMIN: CPT

## 2020-01-09 PROCEDURE — 80053 COMPREHEN METABOLIC PANEL: CPT

## 2020-01-09 PROCEDURE — 85025 COMPLETE CBC W/AUTO DIFF WBC: CPT

## 2020-01-09 PROCEDURE — 99213 OFFICE O/P EST LOW 20 MIN: CPT | Performed by: SURGERY

## 2020-01-09 NOTE — PROGRESS NOTES
RE: Nutrition consult    Per MD request, patient to start on Impact AR Oral supplement 5 days prior to surgery. He is to drink 1 carton 3 x a day for 5 days prior to surgery. Written information provided on how to consume and purchase. No further nutrition intervention warranted.

## 2020-01-09 NOTE — PATIENT INSTRUCTIONS
Your surgery will be changed to 1/21/20    Patient Instructions    Name:   Javed Armijo          Take a Hibiclens shower twice a day for 3 days prior to surgery, including the morning of surgery.   Gargle with Listerine twice a day for 2 days prior to surgery, including the morning of surgery.      _________________     Appointment with someone will call you for an appt time   Pre [facility] for Hospital Admission - Go to 1st floor of the hospital-admitting desk. You will do paper work, get blood drawn,  get x-rays and see Anesthesia at this time.     _________________   CLEAR LIQUIDS all day 1/20/20        Do not eat or drink anything after midnight. 1/20/20                   ____1/21/20______________   Hospital Admission for surgery.  Report to 2nd floor, Same Day Surgery desk at ___7 am_____   Surgery is scheduled for _________        [facility] [place] [address] [place]  [address]  [phone]      INFORMATION REGARDING YOUR PROCEDURE      We look forward to serving you and your family and appreciate that you have chosen [facility] for your healthcare needs. Before, during, and after your surgery, you will be cared for by skilled medical professionals. Our surgeons, anesthesiologists, nurses,  and other healthcare professionals will work with you and your family to ensure a safe, smooth and comfortable surgery and recovery.    In order to best meet your pre-admission needs, your surgeon or ordering physicians office will contact our Scheduling Office at [phone] and schedule a Pre-Procedure Appointment.  This should preferably be done 72 hours or greater before your scheduled procedure date.     During your pre-procedure visit your insurance will be verified for your procedure. You will meet with a Registered Nurse and an Anesthesiologist or Nurse Anesthetist. If tests are required, they will be performed during your visit. Please allow about one and a half  hours for this visit.      You will need to bring the following information or items with you to your Pre-Procedure Appointment:    1.  Picture Identification  2.  Insurance Card  3.  Current list of medications to include name and dosage  4.  List of allergies  5.  Orders and any other forms your doctor has given to you  6.  Copies of lab results performed at other facilities. This may include blood work, EKGs or chest xrays.   These results can be faxed to [phone].    The Pre-Op Center Registration Desk is located on the first floor of the hospital ([address] [place] [address]) in the [facility].  The Pre-Operative Center is located on the second floor of the hospital. Someone will walk you from the registration area to the Pre-Operative Center.    Free parking is located in the front of the hospital and medical office building and is easily accessed from [address] and [address]. When you arrive, please check in at the main information desk of the hospital.    Please call Outpatient Surgery at [phone] after 12:00pm on the day before your procedure for your arrival time and updated procedure time.      Pre-Op Bathing Instructions    Before surgery, you can play an important role in your own health.    Because skin is not sterile, we need to be sure that your skin is as free of germs as possible. By following the instructions below, you can reduce the number of germs on your skin before surgery.    IMPORTANT: You will need to shower with a special soap called Hibiclens*, available at any pharmacy.  If you are allergic to Chlorhexidine (the antiseptic in Hibiclens), use an antibacterial soap such as Dial Soap for your preoperative shower.  You will shower with Hibiclens both the night before your surgery and the morning of your surgery.  Do not use Hibiclens on the head, face or genitals to avoid injury to those areas.    STEP #1: THE NIGHT BEFORE YOUR SURGERY     1. Do not shave the area  of your body where your surgery will be performed.  2. Shower and wash your hair and body as usual with your normal soap and shampoo.  3. Rinse your hair and body thoroughly after you shower to remove all soap residue.  4. With your hand, apply one packet of Hibiclens soap to the surgical site.   5. Wash the site gently for five (5) minutes. Do not scrub your skin too hard.   6. Do not wash with your regular soap after Hibiclens is used.  7. Rinse your body thoroughly.  8. Pat yourself dry with a clean, soft towel.  9. Do not use lotion, cream, or powder.  10. Wear clean clothes.    STEP #2: THE MORNING OF YOUR SURGERY     1. Repeat Step #1.    * Not to be used by people allergic to Chlorhexidine.

## 2020-01-10 ENCOUNTER — TELEPHONE (OUTPATIENT)
Dept: SURGERY | Facility: HOSPITAL | Age: 75
End: 2020-01-10

## 2020-01-14 ENCOUNTER — TELEPHONE (OUTPATIENT)
Dept: NEUROLOGY | Facility: HOSPITAL | Age: 75
End: 2020-01-14

## 2020-01-14 NOTE — TELEPHONE ENCOUNTER
Faxed release form for EUS done at [facility]. Faxed to Medical records [phone]

## 2020-01-17 ENCOUNTER — CLINICAL SUPPORT (OUTPATIENT)
Dept: LAB | Facility: HOSPITAL | Age: 75
End: 2020-01-17
Attending: SURGERY
Payer: COMMERCIAL

## 2020-01-17 ENCOUNTER — TELEPHONE (OUTPATIENT)
Dept: NEUROLOGY | Facility: HOSPITAL | Age: 75
End: 2020-01-17

## 2020-01-17 ENCOUNTER — ANESTHESIA EVENT (OUTPATIENT)
Dept: SURGERY | Facility: HOSPITAL | Age: 75
End: 2020-01-17
Payer: COMMERCIAL

## 2020-01-17 ENCOUNTER — HOSPITAL ENCOUNTER (OUTPATIENT)
Dept: PREADMISSION TESTING | Facility: HOSPITAL | Age: 75
Discharge: HOME | End: 2020-01-17
Attending: SURGERY
Payer: COMMERCIAL

## 2020-01-17 ENCOUNTER — HOSPITAL ENCOUNTER (OUTPATIENT)
Dept: RADIOLOGY | Facility: HOSPITAL | Age: 75
Discharge: HOME | End: 2020-01-17
Attending: SURGERY
Payer: COMMERCIAL

## 2020-01-17 VITALS
BODY MASS INDEX: 37.33 KG/M2 | HEART RATE: 59 BPM | SYSTOLIC BLOOD PRESSURE: 133 MMHG | HEIGHT: 69 IN | DIASTOLIC BLOOD PRESSURE: 61 MMHG | WEIGHT: 252 LBS | OXYGEN SATURATION: 96 % | RESPIRATION RATE: 16 BRPM

## 2020-01-17 DIAGNOSIS — Z01.818 PRE-OP EVALUATION: ICD-10-CM

## 2020-01-17 PROCEDURE — 93010 ELECTROCARDIOGRAM REPORT: CPT | Mod: ,,, | Performed by: INTERNAL MEDICINE

## 2020-01-17 PROCEDURE — 93010 EKG 12-LEAD: ICD-10-PCS | Mod: ,,, | Performed by: INTERNAL MEDICINE

## 2020-01-17 PROCEDURE — 71046 X-RAY EXAM CHEST 2 VIEWS: CPT | Mod: TC,FY

## 2020-01-17 PROCEDURE — 71046 X-RAY EXAM CHEST 2 VIEWS: CPT | Mod: 26,,, | Performed by: RADIOLOGY

## 2020-01-17 PROCEDURE — 71046 XR CHEST PA AND LATERAL PRE-OP: ICD-10-PCS | Mod: 26,,, | Performed by: RADIOLOGY

## 2020-01-17 PROCEDURE — 93005 ELECTROCARDIOGRAM TRACING: CPT

## 2020-01-17 RX ORDER — ASPIRIN 81 MG/1
81 TABLET ORAL DAILY
COMMUNITY

## 2020-01-17 RX ORDER — HYDROXYZINE HYDROCHLORIDE 10 MG/1
10 TABLET, FILM COATED ORAL 3 TIMES DAILY PRN
COMMUNITY

## 2020-01-17 RX ORDER — ERGOCALCIFEROL 1.25 MG/1
50000 CAPSULE ORAL
COMMUNITY

## 2020-01-17 RX ORDER — FUROSEMIDE 20 MG/1
20 TABLET ORAL DAILY
COMMUNITY

## 2020-01-17 RX ORDER — GABAPENTIN 100 MG/1
100 CAPSULE ORAL 2 TIMES DAILY
COMMUNITY

## 2020-01-17 NOTE — PROGRESS NOTES
NOLANETS:  [facility]  A collaboration between [facility] and [facility]      PATIENT: Javed Armijo  MRN: [id number]  DATE: 1/17/2020    Subjective:    Chief Complaint: discussion about the surgery    Vitals: There were no vitals taken for this visit.     ECOG Score: 0 - Asymptomatic    Diagnosis: No diagnosis found.     Interval History:     Will check his liver function tests again and prealbumin level.   January I talked to them about the the risks and benefits of the surgery.  I talked about the the risk of missing early cancer and the dura and the issues of surveillance of pancreatic cancer.  I clearly explained to him the complications of these major procedure versus the downside of missing a cancer which could have been completely resected.   Also clearly told him he is at high risk because of his recent cardiac bypass, renal dysfunction with a creatinine of 1.8.  I also told him about the high risk nature of this surgery he had need to be prepared for Whipple and if central pancreatectomy is possible it would be advantages even though the risks are higher    Oncologic History:   Oncologic History    Oncologic Treatment    Pathology      Past Medical History:  Past Medical History:   Diagnosis Date    CKD (chronic kidney disease)     DM (diabetes mellitus)     HTN (hypertension)     Hyperlipidemia     Hypothyroidism     Kidney mass     TIFFANIE (obstructive sleep apnea)     on CPAP       Past Surgical History:  Past Surgical History:   Procedure Laterality Date    CARDIAC SURGERY      CORONARY ARTERY BYPASS GRAFT  06/2017    x2    JOINT REPLACEMENT Bilateral     MUSCLE REPAIR Right 10/2014    bicep    MUSCLE REPAIR Right     Arm    NASAL SEPTOPLASTY  10/2018    TOTAL KNEE ARTHROPLASTY  2010    UMBILICAL HERNIA REPAIR  12/2011       Family History:  No family history on  file.    Allergies:  Lisinopril    Medications:   Current Outpatient Medications   Medication Sig    aspirin (ECOTRIN) 81 MG EC tablet Take 81 mg by mouth once daily.    atorvastatin calcium (LIPITOR ORAL) Take 20 mg by mouth.    carvedilol (COREG) 6.25 MG tablet Take 6.25 mg by mouth.    cyanocobalamin (VITAMIN B-12) 1000 MCG tablet Take 1,000 mcg by mouth.    ergocalciferol (VITAMIN D2) 50,000 unit Cap Take 50,000 Units by mouth every 30 days.    fenofibrate (TRICOR) 145 MG tablet Take by mouth.    folic acid (FOLVITE) 1 MG tablet Take 1 mg by mouth.    furosemide (LASIX) 20 MG tablet Take 20 mg by mouth once daily.    gabapentin (NEURONTIN) 100 MG capsule Take 100 mg by mouth 2 (two) times daily.    hydrOXYzine HCl (ATARAX) 10 MG Tab Take 10 mg by mouth 3 (three) times daily as needed.    insulin (LANTUS SOLOSTAR U-100 INSULIN) glargine 100 units/mL (3mL) SubQ pen 60 Units.     insulin aspart U-100 (NOVOLOG FLEXPEN U-100 INSULIN) 100 unit/mL (3 mL) InPn pen 20 Units every evening.     levothyroxine (SYNTHROID) 100 MCG tablet Take 100 mcg by mouth.    liraglutide 0.6 mg/0.1 mL, 18 mg/3 mL, subq PNIJ (VICTOZA 2-CITLALY) 0.6 mg/0.1 mL (18 mg/3 mL) PnIj 1.8 mg.    loratadine-pseudoephedrine 5-120 mg (CLARITIN-D 12 HOUR) 5-120 mg per tablet Take 1 tablet by mouth 2 (two) times daily.    omega-3 fatty acids 100 mg Chew Take 4,000 mg by mouth.     Current Facility-Administered Medications   Medication    lidocaine (PF) 10 mg/ml (1%) injection 10 mg        Review of Systems   Objective:      Physical Exam   Assessment:       No diagnosis found.    Laboratory Data:   Neuroendocrine Labs Latest Ref Rng & Units 1/9/2020 1/9/2020   WBC 3.90 - 12.70 K/uL 6.12    EXT WBC 4.8 - 10.8 k/ul     HGB 14.0 - 18.0 g/dL 12.1 (L)    EXT HGB 14.0 - 18.0 g/dl     HCT 40.0 - 54.0 % 39.5 (L)    EXT HCT 42.0 - 52.0 %     PLATLETS 150 - 350 K/uL 197    EXT PLATLETS 140 - 420 k/ul     PT 9.0 - 12.5 sec 12.4    INR 0.8 - 1.2 1.1     GLUCOSE 70 - 110 mg/dL 155 (H)    EXT GLUCOSE 70 - 110 mg/dl     BUN 8 - 23 mg/dL 35 (H)    CREATININE 0.5 - 1.4 mg/dL 2.1 (H)    EXT CREATININE mg/dL      - 145 mmol/L 140    EXT  - 144 meq/l     K 3.5 - 5.1 mmol/L 4.3    EXT K 3.6 - 5.1 meq/l     CHLORIDE 95 - 110 mmol/L 101    CO2 23 - 29 mmol/L 29    EXT CO2 22 - 32 meq/l     CALCIUM 8.7 - 10.5 mg/dL 9.4    PROTEIN, TOTAL 6.0 - 8.4 g/dL 7.1    ALBUMIN 3.5 - 5.2 g/dL 3.6    TOTAL BILIRUBIN 0.1 - 1.0 mg/dL 0.4    EXT TOTAL BILIRUBIN 0.1 - 1.3 mg/dl     ALK PHOSPHATASE 55 - 135 U/L 47 (L)    SGOT (AST) 10 - 40 U/L 23    SGPT (ALT) 10 - 44 U/L 24    EXT LDH 0 - 100 mg/dl     EXT TRIGLYCERIDES 0 - 200 mg/dl     EXT CHOLESETEROL 0 - 240 mg/dl     EXT HDL mg/dl     24 HR URINE PROTEIN 0 - 15 mg/dL     24 HR CREATININE CLEARANCE 23.0 - 375.0 mg/dL     EXT HEMOGLOBIN A1C 4.2 - 5.8 %     Weight   252 lbs 14 oz       Scans:   X-Ray Chest PA And Lateral Pre-OP  Narrative: EXAMINATION:  XR CHEST PA AND LATERAL PRE-OP    CLINICAL HISTORY:  Encounter for other preprocedural examination    TECHNIQUE:  PA and lateral views of the chest were performed.    COMPARISON:  CT of the chest, abdomen and pelvis: 11/14/2019.    FINDINGS:  The patient has undergone prior sternotomy with reconstruction of the sternum using plate and screw construct.  CT of 11/14/2019 reveals nonunion of sternal fragments.  Screws associated with the most inferior plates appear to have backed out.    Mediastinal structures are midline. Cardiac silhouette and pulmonary vascular distribution are normal.    Lung volumes are normal and symmetric. I detect no pulmonary disease, pleural fluid, lymph node enlargement, cardiac decompensation, pneumothorax, pneumomediastinum, pneumoperitoneum or significant osseous abnormality.  Impression: No intrathoracic disease identified.    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  His case was discussed at the tumor board.    Because of his increased risk from his coronary artery disease status post coronary artery bypass surgery, chronic renal insufficiency with increasing creatinine from a baseline of 1.7-2.1 and a major pancreatic surgery requiring Whipple for 2.5 x 1.5 x 1 intraductal papillary mucinous plasma the me main duct with no symptoms, I decided not to intervene his pancreatic issue.  He will get a repeat MRI in 6 months time and then possibly an endoscopic ultrasound and will be followed up thereafter    I had a long discussion with the radiologist about the ablation of his renal cell cancer.  [facility] and the recommendation was to do ablation of his renal cell cancer because of his increased overall risk.  On reviewing scan the duodenum was found closely anterior to the tumor therefore the radiologist wanted the duodenum to be mobilized if possible.    He has gallstones in his gallbladder however he is not symptomatic.  However to do a proper ablation of his renal cell cancer the plan would be to do a robotic cholecystectomy and mobilized the duodenum and place omental pad between the duodenum and the kidney so that he can undergo safe ablation of the renal cell cancer.    I clearly explained to the family that he has asymptomatic cholelithiasis would not require surgery at this point however because of his ease of ablating the renal cell of the duodenum was mobilized and since he is diabetic we will plan for cholecystectomy.  At the time of cholecystectomy the duodenum will be mobilized.  The risk of cholecystectomy such as bleeding infection DVT PE MI a worsening of the kidney function bile duct injury requiring more procedures conversion to open incisional hernia  retained stones were all discussed and informed consent was obtained    Plan:       NPO after midnight  For robotic cholecystectomy and duodenal mobilization    Radiology to see him in about 2 weeks time for ablation of his right renal cell cancer    Follow-up MRI and risk endoscopic ultrasound in 6 months time for follow-up on his IPMN    Minimal I strongly recommended that she take an appointment to see his internal medicine doctor and renal physician at the [facility] for follow-up office overall medical condition and the chronic renal insufficiency          AMEYA Marks MD, FACS   Associate Professor of Surgery, [facility]   Neuroendocrine Surgery, Hepatic/Pancreatic & General Surgery   [address]   [place] [address]   ph. [phone]; [phone]   fax. [phone]

## 2020-01-17 NOTE — TELEPHONE ENCOUNTER
Called pt wife and lvm to set up appt Monday 1/20/20. Asking for a return call to get a time for Monday

## 2020-01-17 NOTE — DISCHARGE INSTRUCTIONS
Your surgery is scheduled for 1/21/20.    Please report to Procedure Check In Room on the 2nd FLOOR at 7:45 a.m.          INSTRUCTIONS IMPORTANT!!!  ¨ Do not eat or drink after 12 midnight-including water. OK to brush teeth, no   gum, candy or mints!    ¨ Take only these medicines with a small swallow of water-morning of surgery: carvedilol and levothyroxine        ____  Proceed to [facility] on 1/17/20 for additional testing.      ____  No powder, lotions or creams to surgical area.  ____  Please remove all jewelry, including piercings and leave at home.  ____  No money or valuables needed. Please leave at home.  ____  Please bring any documents given by your doctor.  ____  If going home the same day, arrange for a ride home. You will not be able to             drive if Anesthesia was used.  ____  Wear loose fitting clothing. Allow for dressings, bandages.  ____  Stop Aspirin, Ibuprofen, Motrin and Aleve at least 3-5 days before surgery, unless otherwise instructed by your doctor, or the nurse.   You MAY use Tylenol/acetaminophen until day of surgery.  ____  Wash the surgical area with Hibiclens the night before surgery, and again the             morning of surgery.  Be sure to rinse hibiclens off completely (if instructed by   nurse).  ____  If you take diabetic medication, do not take am of surgery unless instructed by Doctor.  ____  Call MD for temperature above 101 degrees or any other signs of infection such as Urinary (bladder) infection, Upper respiratory infection, skin boils, etc.  ____ Stop taking any Fish Oil supplement or any Vitamins that contain Vitamin E at least 5 days prior to surgery.  ____ Do Not wear your contact lenses the day of your procedure.  You may wear your glasses.      ____Do not shave surgical site for 3 days prior to surgery.  ____ Practice Good hand washing before, during, and after procedure.      I have read or had read and explained to me, and understand the above  information.  Additional comments or instructions:  For additional questions call [phone]      ANESTHESIA SIDE EFFECTS  -For the first 24 hours after surgery:  Do not drive, use heavy equipment, make important decisions, or drink alcohol  -It is normal to feel sleepy for several hours.  Rest until you are more awake.  -Have someone stay with you, if needed.  They can watch for problems and help keep you safe.  -Some possible post anesthesia side effects include: nausea and vomiting, sore throat and hoarseness, sleepiness, and dizziness.        Pre-Op Bathing Instructions    Before surgery, you can play an important role in your own health.    Because skin is not sterile, we need to be sure that your skin is as free of germs as possible. By following the instructions below, you can reduce the number of germs on your skin before surgery.    IMPORTANT: You will need to shower with a special soap called Hibiclens*, available at any pharmacy.  If you are allergic to Chlorhexidine (the antiseptic in Hibiclens), use an antibacterial soap such as Dial Soap for your preoperative shower.  You will shower with Hibiclens both the night before your surgery and the morning of your surgery.  Do not use Hibiclens on the head, face or genitals to avoid injury to those areas.    STEP #1: THE NIGHT BEFORE YOUR SURGERY     1. Do not shave the area of your body where your surgery will be performed.  2. Shower and wash your hair and body as usual with your normal soap and shampoo.  3. Rinse your hair and body thoroughly after you shower to remove all soap residue.  4. With your hand, apply one packet of Hibiclens soap to the surgical site.   5. Wash the site gently for five (5) minutes. Do not scrub your skin too hard.   6. Do not wash with your regular soap after Hibiclens is used.  7. Rinse your body thoroughly.  8. Pat yourself dry with a clean, soft towel.  9. Do not use lotion, cream, or powder.  10. Wear clean clothes.    STEP #2:  THE MORNING OF YOUR SURGERY     1. Repeat Step #1.    * Not to be used by people allergic to Chlorhexidine.          Cholecystectomy     Clips close off the duct connecting the gallbladder to the bile duct. The gallbladder is then removed.     Youve had painful attacks caused by gallstones. To treat the problem, your healthcare provider wants to remove your gallbladder. This surgery is called cholecystectomy. Removing the gallbladder can relieve pain. It will also prevent future attacks. You can live a healthy life without your gallbladder. You may also be able to go back to eating foods you enjoyed before your gallbladder problems started.  Before your surgery  Be prepared:  · Tell your provider what medicines you take. Include those bought over the counter. Also include herbs or supplements. Be sure to mention if you take prescription blood thinners. This includes warfarin, clopidogrel, and aspirin.  · Have any tests your provider asks for, such as blood tests.  · Dont eat or drink after midnight, the night before your surgery. This includes water, coffee, and mints. However, you may need to take some medicine with sips of water--talk with your healthcare provider.  The day of surgery  When you arrive, you will prepare for surgery:  · An IV line will be put into a vein in your arm or hand. This gives you fluids and medicine.  · An anesthesiologist will talk with you about anesthesia. This is medicine used to prevent pain. You will receive general anesthesia. This puts you into a state like deep sleep through the procedure.  During surgery  There are 2 methods for removing the gallbladder. Your healthcare provider will choose which method is best for you:  · Laparoscopic cholecystectomy. This is most common. During surgery, 2 to 4 small incisions are made. A thin tube with a camera is used. This is called a laparoscope. The scope is put through one of the incisions. It sends images to a video screen. Surgical  tools are put through other incisions. The gallbladder is removed using the scope and these tools.  · Open cholecystectomy. One larger incision is made. The surgeon sees and works through this incision. Open surgery is most often used when scarring or other factors make it a better choice for you.  In some cases, safety requires a change from laparoscopic to open surgery during the procedure.  After surgery  You will be sent to a room to wake up from the anesthesia. You will likely go home the same day. In some cases, an overnight stay is needed. If you had open cholecystectomy, you may need to stay in the hospital for a few days. When you are released to go home, have a family member or friend ready to drive you.  Risks and possible complications of gallbladder surgery  All surgeries have risks. The risks of gallbladder surgery include:  · Bleeding  · Infection  · Injury to the common bile duct or nearby organs  · Blood clots in the legs  · Bile leaks  · Hernia at incision site  · Pnemonia   Date Last Reviewed: 7/1/2016  © 4786-0381 [employer]. [address] [place] [address], [place] [address]. All rights reserved. This information is not intended as a substitute for professional medical care. Always follow your healthcare professional's instructions.

## 2020-01-17 NOTE — PRE-PROCEDURE INSTRUCTIONS
[place] - [phone] - Wife    Allergies, medical, surgical, family and psychosocial histories reviewed with patient. Periop plan of care reviewed. Preop instructions given, including medications to take and to hold. Hibiclens soap and instructions on use given. Time allotted for questions to be addressed.  Patient verbalized understanding.

## 2020-01-17 NOTE — ANESTHESIA PREPROCEDURE EVALUATION
01/17/2020  Javed Armijo is a 74 y.o., male hx HTN, hypothyroidism, DM2, TIFFANIE, CAD s/p cabg scheduled for robotic choley/ ex-lap for kidney cancer with panc lesion.    Past Medical History:   Diagnosis Date    CKD (chronic kidney disease)     DM (diabetes mellitus)     HTN (hypertension)     Hyperlipidemia     Hypothyroidism     Kidney mass     TIFFANIE (obstructive sleep apnea)     on CPAP       Review of patient's allergies indicates:   Allergen Reactions    Lisinopril       Echo   EF 55%    EKG pending    Anesthesia Evaluation    I have reviewed the Patient Summary Reports.        Review of Systems  Anesthesia Hx:  Denies Hx of Anesthetic complications   Denies Personal Hx of Anesthesia complications.   Social:  Former Smoker    Hematology/Oncology:         -- Anemia: Current/Recent Cancer. Oncology Comments: Kidney cancer    EENT/Dental:EENT/Dental Normal   Cardiovascular:   Exercise tolerance: good Hypertension CABG/stent EKG pending   Pulmonary:   Sleep Apnea    Renal/:   Chronic Renal Disease    Hepatic/GI:  Hepatic/GI Normal    Musculoskeletal:  Musculoskeletal Normal    Neurological:  Neurology Normal    Endocrine:   Diabetes, type 2 Hypothyroidism        Physical Exam  General:  Well nourished    Airway/Jaw/Neck:  Airway Findings: Mouth Opening: Normal Tongue: Normal  General Airway Assessment: Adult  Mallampati: III      Dental:  Dental Findings: In tact   Chest/Lungs:  Chest/Lungs Findings: Normal Respiratory Rate     Heart/Vascular:  Heart Findings: Rate: Normal  Rhythm: Regular Rhythm        Mental Status:  Mental Status Findings:  Cooperative, Alert and Oriented        Lab Results   Component Value Date    WBC 6.12 01/09/2020    HGB 12.1 (L) 01/09/2020    HCT 39.5 (L) 01/09/2020     01/09/2020    ALT 24 01/09/2020    AST 23 01/09/2020     01/09/2020    K 4.3 01/09/2020    CL  101 01/09/2020    CREATININE 2.1 (H) 01/09/2020    BUN 35 (H) 01/09/2020    CO2 29 01/09/2020    INR 1.1 01/09/2020    HGBA1C 7.8 (A) 08/22/2019         Anesthesia Plan  Type of Anesthesia, risks & benefits discussed:  Anesthesia Type:  general  Patient's Preference:   Intra-op Monitoring Plan: standard ASA monitors  Intra-op Monitoring Plan Comments:   Post Op Pain Control Plan: multimodal analgesia  Post Op Pain Control Plan Comments:   Induction:   IV  Beta Blocker:  Patient is not currently on a Beta-Blocker (No further documentation required).       Informed Consent: Patient understands risks and agrees with Anesthesia plan.  Questions answered. Anesthesia consent signed with patient.  ASA Score: 3     Day of Surgery Review of History & Physical:            Ready For Surgery From Anesthesia Perspective.

## 2020-01-17 NOTE — TELEPHONE ENCOUNTER
[facility] - she was confused about the authorization, the surgical case requested, and stated that she did not have clinicals.  I faxed her all of the patient's documentation, along with the surgical case information and the authorizations we received for him to be treated here.

## 2020-01-17 NOTE — H&P (VIEW-ONLY)
NOLANETS:  [facility]  A collaboration between [facility] and [facility]      PATIENT: Javed Armijo  MRN: [id number]  DATE: 1/17/2020    Subjective:    Chief Complaint: discussion about the surgery    Vitals: There were no vitals taken for this visit.     ECOG Score: 0 - Asymptomatic    Diagnosis: No diagnosis found.     Interval History:     Will check his liver function tests again and prealbumin level.   January I talked to them about the the risks and benefits of the surgery.  I talked about the the risk of missing early cancer and the dura and the issues of surveillance of pancreatic cancer.  I clearly explained to him the complications of these major procedure versus the downside of missing a cancer which could have been completely resected.   Also clearly told him he is at high risk because of his recent cardiac bypass, renal dysfunction with a creatinine of 1.8.  I also told him about the high risk nature of this surgery he had need to be prepared for Whipple and if central pancreatectomy is possible it would be advantages even though the risks are higher    Oncologic History:   Oncologic History    Oncologic Treatment    Pathology      Past Medical History:  Past Medical History:   Diagnosis Date    CKD (chronic kidney disease)     DM (diabetes mellitus)     HTN (hypertension)     Hyperlipidemia     Hypothyroidism     Kidney mass     TIFFANIE (obstructive sleep apnea)     on CPAP       Past Surgical History:  Past Surgical History:   Procedure Laterality Date    CARDIAC SURGERY      CORONARY ARTERY BYPASS GRAFT  06/2017    x2    JOINT REPLACEMENT Bilateral     MUSCLE REPAIR Right 10/2014    bicep    MUSCLE REPAIR Right     Arm    NASAL SEPTOPLASTY  10/2018    TOTAL KNEE ARTHROPLASTY  2010    UMBILICAL HERNIA REPAIR  12/2011       Family History:  No family history on  file.    Allergies:  Lisinopril    Medications:   Current Outpatient Medications   Medication Sig    aspirin (ECOTRIN) 81 MG EC tablet Take 81 mg by mouth once daily.    atorvastatin calcium (LIPITOR ORAL) Take 20 mg by mouth.    carvedilol (COREG) 6.25 MG tablet Take 6.25 mg by mouth.    cyanocobalamin (VITAMIN B-12) 1000 MCG tablet Take 1,000 mcg by mouth.    ergocalciferol (VITAMIN D2) 50,000 unit Cap Take 50,000 Units by mouth every 30 days.    fenofibrate (TRICOR) 145 MG tablet Take by mouth.    folic acid (FOLVITE) 1 MG tablet Take 1 mg by mouth.    furosemide (LASIX) 20 MG tablet Take 20 mg by mouth once daily.    gabapentin (NEURONTIN) 100 MG capsule Take 100 mg by mouth 2 (two) times daily.    hydrOXYzine HCl (ATARAX) 10 MG Tab Take 10 mg by mouth 3 (three) times daily as needed.    insulin (LANTUS SOLOSTAR U-100 INSULIN) glargine 100 units/mL (3mL) SubQ pen 60 Units.     insulin aspart U-100 (NOVOLOG FLEXPEN U-100 INSULIN) 100 unit/mL (3 mL) InPn pen 20 Units every evening.     levothyroxine (SYNTHROID) 100 MCG tablet Take 100 mcg by mouth.    liraglutide 0.6 mg/0.1 mL, 18 mg/3 mL, subq PNIJ (VICTOZA 2-CITLALY) 0.6 mg/0.1 mL (18 mg/3 mL) PnIj 1.8 mg.    loratadine-pseudoephedrine 5-120 mg (CLARITIN-D 12 HOUR) 5-120 mg per tablet Take 1 tablet by mouth 2 (two) times daily.    omega-3 fatty acids 100 mg Chew Take 4,000 mg by mouth.     Current Facility-Administered Medications   Medication    lidocaine (PF) 10 mg/ml (1%) injection 10 mg        Review of Systems   Objective:      Physical Exam   Assessment:       No diagnosis found.    Laboratory Data:   Neuroendocrine Labs Latest Ref Rng & Units 1/9/2020 1/9/2020   WBC 3.90 - 12.70 K/uL 6.12    EXT WBC 4.8 - 10.8 k/ul     HGB 14.0 - 18.0 g/dL 12.1 (L)    EXT HGB 14.0 - 18.0 g/dl     HCT 40.0 - 54.0 % 39.5 (L)    EXT HCT 42.0 - 52.0 %     PLATLETS 150 - 350 K/uL 197    EXT PLATLETS 140 - 420 k/ul     PT 9.0 - 12.5 sec 12.4    INR 0.8 - 1.2 1.1     GLUCOSE 70 - 110 mg/dL 155 (H)    EXT GLUCOSE 70 - 110 mg/dl     BUN 8 - 23 mg/dL 35 (H)    CREATININE 0.5 - 1.4 mg/dL 2.1 (H)    EXT CREATININE mg/dL      - 145 mmol/L 140    EXT  - 144 meq/l     K 3.5 - 5.1 mmol/L 4.3    EXT K 3.6 - 5.1 meq/l     CHLORIDE 95 - 110 mmol/L 101    CO2 23 - 29 mmol/L 29    EXT CO2 22 - 32 meq/l     CALCIUM 8.7 - 10.5 mg/dL 9.4    PROTEIN, TOTAL 6.0 - 8.4 g/dL 7.1    ALBUMIN 3.5 - 5.2 g/dL 3.6    TOTAL BILIRUBIN 0.1 - 1.0 mg/dL 0.4    EXT TOTAL BILIRUBIN 0.1 - 1.3 mg/dl     ALK PHOSPHATASE 55 - 135 U/L 47 (L)    SGOT (AST) 10 - 40 U/L 23    SGPT (ALT) 10 - 44 U/L 24    EXT LDH 0 - 100 mg/dl     EXT TRIGLYCERIDES 0 - 200 mg/dl     EXT CHOLESETEROL 0 - 240 mg/dl     EXT HDL mg/dl     24 HR URINE PROTEIN 0 - 15 mg/dL     24 HR CREATININE CLEARANCE 23.0 - 375.0 mg/dL     EXT HEMOGLOBIN A1C 4.2 - 5.8 %     Weight   252 lbs 14 oz       Scans:   X-Ray Chest PA And Lateral Pre-OP  Narrative: EXAMINATION:  XR CHEST PA AND LATERAL PRE-OP    CLINICAL HISTORY:  Encounter for other preprocedural examination    TECHNIQUE:  PA and lateral views of the chest were performed.    COMPARISON:  CT of the chest, abdomen and pelvis: 11/14/2019.    FINDINGS:  The patient has undergone prior sternotomy with reconstruction of the sternum using plate and screw construct.  CT of 11/14/2019 reveals nonunion of sternal fragments.  Screws associated with the most inferior plates appear to have backed out.    Mediastinal structures are midline. Cardiac silhouette and pulmonary vascular distribution are normal.    Lung volumes are normal and symmetric. I detect no pulmonary disease, pleural fluid, lymph node enlargement, cardiac decompensation, pneumothorax, pneumomediastinum, pneumoperitoneum or significant osseous abnormality.  Impression: No intrathoracic disease identified.    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  His case was discussed at the tumor board.    Because of his increased risk from his coronary artery disease status post coronary artery bypass surgery, chronic renal insufficiency with increasing creatinine from a baseline of 1.7-2.1 and a major pancreatic surgery requiring Whipple for 2.5 x 1.5 x 1 intraductal papillary mucinous plasma the me main duct with no symptoms, I decided not to intervene his pancreatic issue.  He will get a repeat MRI in 6 months time and then possibly an endoscopic ultrasound and will be followed up thereafter    I had a long discussion with the radiologist about the ablation of his renal cell cancer.  [facility] and [facility] was to do ablation of his renal cell cancer because of his increased overall risk.  On reviewing scan the duodenum was found closely anterior to the tumor therefore the radiologist wanted the duodenum to be mobilized if possible.    He has gallstones in his gallbladder however he is not symptomatic.  However to do a proper ablation of his renal cell cancer the plan would be to do a robotic cholecystectomy and mobilized the duodenum and place omental pad between the duodenum and the kidney so that he can undergo safe ablation of the renal cell cancer.    I clearly explained to the family that he has asymptomatic cholelithiasis would not require surgery at this point however because of his ease of ablating the renal cell of the duodenum was mobilized and since he is diabetic we will plan for cholecystectomy.  At the time of cholecystectomy the duodenum will be mobilized.  The risk of cholecystectomy such as bleeding infection DVT PE MI a worsening of the kidney function bile duct injury requiring more procedures conversion to open incisional hernia  retained stones were all discussed and informed consent was obtained    Plan:       NPO after midnight  For robotic cholecystectomy and duodenal mobilization    Radiology to see him in about 2 weeks time for ablation of his right renal cell cancer    Follow-up MRI and risk endoscopic ultrasound in 6 months time for follow-up on his IPMN    Minimal I strongly recommended that she take an appointment to see his internal medicine doctor and renal physician at the [facility] for follow-up office overall medical condition and the chronic renal insufficiency          AMEYA Marks MD, FACS   Associate Professor of Surgery, [facility]   Neuroendocrine Surgery, Hepatic/Pancreatic & General Surgery   [address]   [place] [address]   ph. [phone]; [phone]   fax. [phone]

## 2020-01-20 ENCOUNTER — OFFICE VISIT (OUTPATIENT)
Dept: NEUROLOGY | Facility: HOSPITAL | Age: 75
End: 2020-01-20
Attending: SURGERY
Payer: OTHER GOVERNMENT

## 2020-01-20 VITALS
BODY MASS INDEX: 36.57 KG/M2 | HEART RATE: 64 BPM | SYSTOLIC BLOOD PRESSURE: 137 MMHG | WEIGHT: 246.94 LBS | HEIGHT: 69 IN | DIASTOLIC BLOOD PRESSURE: 63 MMHG

## 2020-01-20 DIAGNOSIS — N18.30 CRF (CHRONIC RENAL FAILURE), STAGE 3 (MODERATE): ICD-10-CM

## 2020-01-20 DIAGNOSIS — C64.1 RENAL CELL CANCER, RIGHT: Primary | ICD-10-CM

## 2020-01-20 DIAGNOSIS — D49.0 IPMN (INTRADUCTAL PAPILLARY MUCINOUS NEOPLASM): ICD-10-CM

## 2020-01-20 PROCEDURE — 99213 OFFICE O/P EST LOW 20 MIN: CPT | Performed by: SURGERY

## 2020-01-20 NOTE — PATIENT INSTRUCTIONS
Your Surgery is tomorrow 1/21/20    Dr. Reyes's office will call you for appointment in 2 weeks      Patient Instructions    Name:   Javed Armijo          Take a Hibiclens shower twice a day for 3 days prior to surgery, including the morning of surgery.   Gargle with Listerine twice a day for 2 days prior to surgery, including the morning of surgery.      _________________          _________________   CLEAR LIQUIDS all day     Take antibiotics as prescribed   Do not eat or drink anything after midnight.                   ___1/21/20    7am_______________   Hospital Admission for surgery.  Report to 2nd floor, Same Day Surgery desk at ________   Surgery is scheduled for _1/21/20________        [facility] [address] [place]  [address]  [phone]      INFORMATION REGARDING YOUR PROCEDURE      We look forward to serving you and your family and appreciate that you have chosen [facility] for your healthcare needs. Before, during, and after your surgery, you will be cared for by skilled medical professionals. Our surgeons, anesthesiologists, nurses,  and other healthcare professionals will work with you and your family to ensure a safe, smooth and comfortable surgery and recovery.    In order to best meet your pre-admission needs, your surgeon or ordering physicians office will contact our Scheduling Office at [phone] and schedule a Pre-Procedure Appointment.  This should preferably be done 72 hours or greater before your scheduled procedure date.     During your pre-procedure visit your insurance will be verified for your procedure. You will meet with a Registered Nurse and an Anesthesiologist or Nurse Anesthetist. If tests are required, they will be performed during your visit. Please allow about one and a half hours for this visit.      You will need to bring the following information or items with you to your Pre-Procedure Appointment:    1.  Picture  Identification  2.  Insurance Card  3.  Current list of medications to include name and dosage  4.  List of allergies  5.  Orders and any other forms your doctor has given to you  6.  Copies of lab results performed at other facilities. This may include blood work, EKGs or chest xrays.   These results can be faxed to [phone].    The Pre-Op Center Registration Desk is located on the first floor of the hospital ([address] [place] [address]) in the [facility].  The Pre-Operative Center is located on the second floor of the hospital. Someone will walk you from the registration area to the Pre-Operative Center.    Free parking is located in the front of the hospital and medical office building and is easily accessed from [address] and [address]. When you arrive, please check in at the main information desk of the hospital.    Please call Outpatient Surgery at [phone] after 12:00pm on the day before your procedure for your arrival time and updated procedure time.      Pre-Op Bathing Instructions    Before surgery, you can play an important role in your own health.    Because skin is not sterile, we need to be sure that your skin is as free of germs as possible. By following the instructions below, you can reduce the number of germs on your skin before surgery.    IMPORTANT: You will need to shower with a special soap called Hibiclens*, available at any pharmacy.  If you are allergic to Chlorhexidine (the antiseptic in Hibiclens), use an antibacterial soap such as Dial Soap for your preoperative shower.  You will shower with Hibiclens both the night before your surgery and the morning of your surgery.  Do not use Hibiclens on the head, face or genitals to avoid injury to those areas.    STEP #1: THE NIGHT BEFORE YOUR SURGERY     1. Do not shave the area of your body where your surgery will be performed.  2. Shower and wash your hair and body as usual with your normal soap and shampoo.  3. Rinse  your hair and body thoroughly after you shower to remove all soap residue.  4. With your hand, apply one packet of Hibiclens soap to the surgical site.   5. Wash the site gently for five (5) minutes. Do not scrub your skin too hard.   6. Do not wash with your regular soap after Hibiclens is used.  7. Rinse your body thoroughly.  8. Pat yourself dry with a clean, soft towel.  9. Do not use lotion, cream, or powder.  10. Wear clean clothes.    STEP #2: THE MORNING OF YOUR SURGERY     1. Repeat Step #1.    * Not to be used by people allergic to Chlorhexidine.

## 2020-01-21 ENCOUNTER — ANESTHESIA (OUTPATIENT)
Dept: SURGERY | Facility: HOSPITAL | Age: 75
End: 2020-01-21
Payer: COMMERCIAL

## 2020-01-21 ENCOUNTER — HOSPITAL ENCOUNTER (OUTPATIENT)
Facility: HOSPITAL | Age: 75
LOS: 1 days | Discharge: HOME | End: 2020-01-22
Attending: SURGERY | Admitting: SURGERY
Payer: COMMERCIAL

## 2020-01-21 DIAGNOSIS — C64.1 RENAL CELL ADENOCARCINOMA, RIGHT: ICD-10-CM

## 2020-01-21 DIAGNOSIS — K80.10 CHRONIC CALCULOUS CHOLECYSTITIS: Primary | ICD-10-CM

## 2020-01-21 DIAGNOSIS — D49.0 IPMN (INTRADUCTAL PAPILLARY MUCINOUS NEOPLASM): ICD-10-CM

## 2020-01-21 DIAGNOSIS — N18.4 CHRONIC RENAL DISEASE, STAGE 4, SEVERELY DECREASED GLOMERULAR FILTRATION RATE BETWEEN 15-29 ML/MIN/1.73 SQUARE METER: ICD-10-CM

## 2020-01-21 DIAGNOSIS — K81.1 CHRONIC CHOLECYSTITIS: ICD-10-CM

## 2020-01-21 LAB
POCT GLUCOSE: 185 MG/DL (ref 70–110)
POCT GLUCOSE: 204 MG/DL (ref 70–110)

## 2020-01-21 PROCEDURE — 25000003 PHARM REV CODE 250: Performed by: SURGERY

## 2020-01-21 PROCEDURE — G0378 HOSPITAL OBSERVATION PER HR: HCPCS

## 2020-01-21 PROCEDURE — 37000008 HC ANESTHESIA 1ST 15 MINUTES: Performed by: SURGERY

## 2020-01-21 PROCEDURE — 63600175 PHARM REV CODE 636 W HCPCS: Performed by: SURGERY

## 2020-01-21 PROCEDURE — S0020 INJECTION, BUPIVICAINE HYDRO: HCPCS | Performed by: SURGERY

## 2020-01-21 PROCEDURE — 71000033 HC RECOVERY, INTIAL HOUR: Performed by: SURGERY

## 2020-01-21 PROCEDURE — 88304 TISSUE EXAM BY PATHOLOGIST: CPT | Mod: 26,,, | Performed by: PATHOLOGY

## 2020-01-21 PROCEDURE — 88304 TISSUE EXAM BY PATHOLOGIST: CPT | Performed by: PATHOLOGY

## 2020-01-21 PROCEDURE — C9290 INJ, BUPIVACAINE LIPOSOME: HCPCS | Performed by: SURGERY

## 2020-01-21 PROCEDURE — 94799 UNLISTED PULMONARY SVC/PX: CPT

## 2020-01-21 PROCEDURE — 00790 ANES IPER UPR ABD NOS: CPT | Performed by: SURGERY

## 2020-01-21 PROCEDURE — 37000009 HC ANESTHESIA EA ADD 15 MINS: Performed by: SURGERY

## 2020-01-21 PROCEDURE — 96372 THER/PROPH/DIAG INJ SC/IM: CPT | Mod: 59

## 2020-01-21 PROCEDURE — 63600175 PHARM REV CODE 636 W HCPCS: Performed by: NURSE ANESTHETIST, CERTIFIED REGISTERED

## 2020-01-21 PROCEDURE — 27201423 OPTIME MED/SURG SUP & DEVICES STERILE SUPPLY: Performed by: SURGERY

## 2020-01-21 PROCEDURE — 25000003 PHARM REV CODE 250: Performed by: NURSE ANESTHETIST, CERTIFIED REGISTERED

## 2020-01-21 PROCEDURE — 27000221 HC OXYGEN, UP TO 24 HOURS

## 2020-01-21 PROCEDURE — 63600175 PHARM REV CODE 636 W HCPCS: Performed by: STUDENT IN AN ORGANIZED HEALTH CARE EDUCATION/TRAINING PROGRAM

## 2020-01-21 PROCEDURE — 36000711: Performed by: SURGERY

## 2020-01-21 PROCEDURE — 99900035 HC TECH TIME PER 15 MIN (STAT)

## 2020-01-21 PROCEDURE — 36000710: Performed by: SURGERY

## 2020-01-21 PROCEDURE — 88304 PR  SURG PATH,LEVEL III: ICD-10-PCS | Mod: 26,,, | Performed by: PATHOLOGY

## 2020-01-21 RX ORDER — LIDOCAINE HCL/PF 100 MG/5ML
SYRINGE (ML) INTRAVENOUS
Status: DISCONTINUED | OUTPATIENT
Start: 2020-01-21 | End: 2020-01-21

## 2020-01-21 RX ORDER — FOLIC ACID 1 MG/1
1 TABLET ORAL DAILY
Status: DISCONTINUED | OUTPATIENT
Start: 2020-01-22 | End: 2020-01-22 | Stop reason: HOSPADM

## 2020-01-21 RX ORDER — ONDANSETRON 2 MG/ML
8 INJECTION INTRAMUSCULAR; INTRAVENOUS EVERY 6 HOURS PRN
Status: DISCONTINUED | OUTPATIENT
Start: 2020-01-21 | End: 2020-01-22 | Stop reason: HOSPADM

## 2020-01-21 RX ORDER — BUPIVACAINE HYDROCHLORIDE 2.5 MG/ML
INJECTION, SOLUTION INFILTRATION; PERINEURAL
Status: DISCONTINUED | OUTPATIENT
Start: 2020-01-21 | End: 2020-01-21

## 2020-01-21 RX ORDER — LEVOTHYROXINE SODIUM 50 UG/1
100 TABLET ORAL
Status: DISCONTINUED | OUTPATIENT
Start: 2020-01-22 | End: 2020-01-22 | Stop reason: HOSPADM

## 2020-01-21 RX ORDER — OXYCODONE AND ACETAMINOPHEN 5; 325 MG/1; MG/1
1 TABLET ORAL EVERY 4 HOURS PRN
Status: DISCONTINUED | OUTPATIENT
Start: 2020-01-21 | End: 2020-01-22 | Stop reason: HOSPADM

## 2020-01-21 RX ORDER — SUCCINYLCHOLINE CHLORIDE 20 MG/ML
INJECTION INTRAMUSCULAR; INTRAVENOUS
Status: DISCONTINUED | OUTPATIENT
Start: 2020-01-21 | End: 2020-01-21

## 2020-01-21 RX ORDER — GLUCAGON 1 MG
1 KIT INJECTION
Status: DISCONTINUED | OUTPATIENT
Start: 2020-01-21 | End: 2020-01-22 | Stop reason: HOSPADM

## 2020-01-21 RX ORDER — ROCURONIUM BROMIDE 10 MG/ML
INJECTION, SOLUTION INTRAVENOUS
Status: DISCONTINUED | OUTPATIENT
Start: 2020-01-21 | End: 2020-01-21

## 2020-01-21 RX ORDER — GLYCOPYRROLATE 0.2 MG/ML
INJECTION INTRAMUSCULAR; INTRAVENOUS
Status: DISCONTINUED | OUTPATIENT
Start: 2020-01-21 | End: 2020-01-21

## 2020-01-21 RX ORDER — FUROSEMIDE 20 MG/1
20 TABLET ORAL DAILY
Status: DISCONTINUED | OUTPATIENT
Start: 2020-01-22 | End: 2020-01-22 | Stop reason: HOSPADM

## 2020-01-21 RX ORDER — GABAPENTIN 100 MG/1
100 CAPSULE ORAL 2 TIMES DAILY
Status: DISCONTINUED | OUTPATIENT
Start: 2020-01-21 | End: 2020-01-22 | Stop reason: HOSPADM

## 2020-01-21 RX ORDER — SODIUM CHLORIDE 9 MG/ML
INJECTION, SOLUTION INTRAVENOUS CONTINUOUS PRN
Status: DISCONTINUED | OUTPATIENT
Start: 2020-01-21 | End: 2020-01-21

## 2020-01-21 RX ORDER — ONDANSETRON 2 MG/ML
4 INJECTION INTRAMUSCULAR; INTRAVENOUS DAILY PRN
Status: DISCONTINUED | OUTPATIENT
Start: 2020-01-21 | End: 2020-01-21 | Stop reason: HOSPADM

## 2020-01-21 RX ORDER — HYDROMORPHONE HYDROCHLORIDE 2 MG/ML
0.5 INJECTION, SOLUTION INTRAMUSCULAR; INTRAVENOUS; SUBCUTANEOUS EVERY 6 HOURS PRN
Status: DISCONTINUED | OUTPATIENT
Start: 2020-01-21 | End: 2020-01-22 | Stop reason: HOSPADM

## 2020-01-21 RX ORDER — LANOLIN ALCOHOL/MO/W.PET/CERES
1000 CREAM (GRAM) TOPICAL DAILY
Status: DISCONTINUED | OUTPATIENT
Start: 2020-01-22 | End: 2020-01-22 | Stop reason: HOSPADM

## 2020-01-21 RX ORDER — IBUPROFEN 200 MG
24 TABLET ORAL
Status: DISCONTINUED | OUTPATIENT
Start: 2020-01-21 | End: 2020-01-22 | Stop reason: HOSPADM

## 2020-01-21 RX ORDER — INSULIN ASPART 100 [IU]/ML
1-10 INJECTION, SOLUTION INTRAVENOUS; SUBCUTANEOUS
Status: DISCONTINUED | OUTPATIENT
Start: 2020-01-21 | End: 2020-01-22 | Stop reason: HOSPADM

## 2020-01-21 RX ORDER — FENOFIBRATE 145 MG/1
145 TABLET, FILM COATED ORAL DAILY
Status: DISCONTINUED | OUTPATIENT
Start: 2020-01-22 | End: 2020-01-22 | Stop reason: HOSPADM

## 2020-01-21 RX ORDER — CARVEDILOL 6.25 MG/1
6.25 TABLET ORAL DAILY
Status: DISCONTINUED | OUTPATIENT
Start: 2020-01-22 | End: 2020-01-22 | Stop reason: HOSPADM

## 2020-01-21 RX ORDER — SODIUM CHLORIDE, SODIUM LACTATE, POTASSIUM CHLORIDE, CALCIUM CHLORIDE 600; 310; 30; 20 MG/100ML; MG/100ML; MG/100ML; MG/100ML
INJECTION, SOLUTION INTRAVENOUS CONTINUOUS PRN
Status: DISCONTINUED | OUTPATIENT
Start: 2020-01-21 | End: 2020-01-21

## 2020-01-21 RX ORDER — ACETAMINOPHEN 10 MG/ML
INJECTION, SOLUTION INTRAVENOUS
Status: DISCONTINUED | OUTPATIENT
Start: 2020-01-21 | End: 2020-01-21

## 2020-01-21 RX ORDER — PROPOFOL 10 MG/ML
VIAL (ML) INTRAVENOUS
Status: DISCONTINUED | OUTPATIENT
Start: 2020-01-21 | End: 2020-01-21

## 2020-01-21 RX ORDER — ATORVASTATIN CALCIUM 20 MG/1
20 TABLET, FILM COATED ORAL DAILY
Status: DISCONTINUED | OUTPATIENT
Start: 2020-01-22 | End: 2020-01-22 | Stop reason: HOSPADM

## 2020-01-21 RX ORDER — ONDANSETRON HYDROCHLORIDE 2 MG/ML
INJECTION, SOLUTION INTRAMUSCULAR; INTRAVENOUS
Status: DISCONTINUED | OUTPATIENT
Start: 2020-01-21 | End: 2020-01-21

## 2020-01-21 RX ORDER — CISATRACURIUM BESYLATE 2 MG/ML
INJECTION, SOLUTION INTRAVENOUS
Status: DISCONTINUED | OUTPATIENT
Start: 2020-01-21 | End: 2020-01-21

## 2020-01-21 RX ORDER — ERGOCALCIFEROL 1.25 MG/1
50000 CAPSULE ORAL
Status: DISCONTINUED | OUTPATIENT
Start: 2020-01-22 | End: 2020-01-22 | Stop reason: HOSPADM

## 2020-01-21 RX ORDER — IBUPROFEN 200 MG
16 TABLET ORAL
Status: DISCONTINUED | OUTPATIENT
Start: 2020-01-21 | End: 2020-01-22 | Stop reason: HOSPADM

## 2020-01-21 RX ORDER — SODIUM CHLORIDE 9 MG/ML
INJECTION, SOLUTION INTRAVENOUS CONTINUOUS
Status: DISCONTINUED | OUTPATIENT
Start: 2020-01-21 | End: 2020-01-21

## 2020-01-21 RX ORDER — ASPIRIN 81 MG/1
81 TABLET ORAL DAILY
Status: DISCONTINUED | OUTPATIENT
Start: 2020-01-22 | End: 2020-01-22 | Stop reason: HOSPADM

## 2020-01-21 RX ORDER — NEOSTIGMINE METHYLSULFATE 1 MG/ML
INJECTION, SOLUTION INTRAVENOUS
Status: DISCONTINUED | OUTPATIENT
Start: 2020-01-21 | End: 2020-01-21

## 2020-01-21 RX ORDER — EPHEDRINE SULFATE 50 MG/ML
INJECTION, SOLUTION INTRAVENOUS
Status: DISCONTINUED | OUTPATIENT
Start: 2020-01-21 | End: 2020-01-21

## 2020-01-21 RX ORDER — POLYETHYLENE GLYCOL 3350 17 G/17G
17 POWDER, FOR SOLUTION ORAL DAILY
Status: DISCONTINUED | OUTPATIENT
Start: 2020-01-22 | End: 2020-01-22 | Stop reason: HOSPADM

## 2020-01-21 RX ORDER — FENTANYL CITRATE 50 UG/ML
INJECTION, SOLUTION INTRAMUSCULAR; INTRAVENOUS
Status: DISCONTINUED | OUTPATIENT
Start: 2020-01-21 | End: 2020-01-21

## 2020-01-21 RX ADMIN — OXYCODONE HYDROCHLORIDE AND ACETAMINOPHEN 1 TABLET: 5; 325 TABLET ORAL at 06:01

## 2020-01-21 RX ADMIN — SODIUM CHLORIDE: 0.9 INJECTION, SOLUTION INTRAVENOUS at 04:01

## 2020-01-21 RX ADMIN — EPHEDRINE SULFATE 10 MG: 50 INJECTION, SOLUTION INTRAMUSCULAR; INTRAVENOUS; SUBCUTANEOUS at 02:01

## 2020-01-21 RX ADMIN — ONDANSETRON 8 MG: 2 INJECTION, SOLUTION INTRAMUSCULAR; INTRAVENOUS at 04:01

## 2020-01-21 RX ADMIN — FENTANYL CITRATE 50 MCG: 50 INJECTION, SOLUTION INTRAMUSCULAR; INTRAVENOUS at 04:01

## 2020-01-21 RX ADMIN — ROCURONIUM BROMIDE 5 MG: 10 INJECTION, SOLUTION INTRAVENOUS at 02:01

## 2020-01-21 RX ADMIN — PROPOFOL 140 MG: 10 INJECTION, EMULSION INTRAVENOUS at 02:01

## 2020-01-21 RX ADMIN — FENTANYL CITRATE 50 MCG: 50 INJECTION, SOLUTION INTRAMUSCULAR; INTRAVENOUS at 03:01

## 2020-01-21 RX ADMIN — CISATRACURIUM BESYLATE 4 MG: 2 INJECTION INTRAVENOUS at 04:01

## 2020-01-21 RX ADMIN — Medication 100 MG: at 02:01

## 2020-01-21 RX ADMIN — PIPERACILLIN AND TAZOBACTAM 4.5 G: 4; .5 INJECTION, POWDER, LYOPHILIZED, FOR SOLUTION INTRAVENOUS; PARENTERAL at 02:01

## 2020-01-21 RX ADMIN — ACETAMINOPHEN 1000 MG: 10 INJECTION, SOLUTION INTRAVENOUS at 04:01

## 2020-01-21 RX ADMIN — HYDROMORPHONE HYDROCHLORIDE 0.5 MG: 2 INJECTION, SOLUTION INTRAMUSCULAR; INTRAVENOUS; SUBCUTANEOUS at 06:01

## 2020-01-21 RX ADMIN — INSULIN ASPART 2 UNITS: 100 INJECTION, SOLUTION INTRAVENOUS; SUBCUTANEOUS at 09:01

## 2020-01-21 RX ADMIN — NEOSTIGMINE METHYLSULFATE 5 MG: 1 INJECTION INTRAVENOUS at 05:01

## 2020-01-21 RX ADMIN — CISATRACURIUM BESYLATE 8 MG: 2 INJECTION INTRAVENOUS at 03:01

## 2020-01-21 RX ADMIN — EPHEDRINE SULFATE 10 MG: 50 INJECTION, SOLUTION INTRAMUSCULAR; INTRAVENOUS; SUBCUTANEOUS at 03:01

## 2020-01-21 RX ADMIN — FENTANYL CITRATE 50 MCG: 50 INJECTION, SOLUTION INTRAMUSCULAR; INTRAVENOUS at 05:01

## 2020-01-21 RX ADMIN — SODIUM CHLORIDE, SODIUM LACTATE, POTASSIUM CHLORIDE, AND CALCIUM CHLORIDE: .6; .31; .03; .02 INJECTION, SOLUTION INTRAVENOUS at 12:01

## 2020-01-21 RX ADMIN — GLYCOPYRROLATE 0.6 MG: 0.2 INJECTION, SOLUTION INTRAMUSCULAR; INTRAVENOUS at 05:01

## 2020-01-21 RX ADMIN — FENTANYL CITRATE 50 MCG: 50 INJECTION, SOLUTION INTRAMUSCULAR; INTRAVENOUS at 02:01

## 2020-01-21 RX ADMIN — CISATRACURIUM BESYLATE 4 MG: 2 INJECTION INTRAVENOUS at 03:01

## 2020-01-21 RX ADMIN — SUCCINYLCHOLINE CHLORIDE 120 MG: 20 INJECTION, SOLUTION INTRAMUSCULAR; INTRAVENOUS at 02:01

## 2020-01-21 RX ADMIN — GLYCOPYRROLATE 0.2 MG: 0.2 INJECTION, SOLUTION INTRAMUSCULAR; INTRAVENOUS at 04:01

## 2020-01-21 RX ADMIN — SODIUM CHLORIDE: 0.9 INJECTION, SOLUTION INTRAVENOUS at 02:01

## 2020-01-21 NOTE — PLAN OF CARE
Family and patient updated on delay. I apologized for the delay. Patient's wife and daughter given meal vouchers for the delay.

## 2020-01-21 NOTE — OP NOTE
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]  IPMN (intraductal papillary mucinous neoplasm) [D49.0]  Chronic renal disease, stage 4, severely decreased glomerular filtration rate between 15-29 mL/min/1.73 square meter [N18.4]  Chronic cholecystitis [K81.1]    Post-Operative Diagnosis: Post-Op Diagnosis Codes:     * Renal cell adenocarcinoma, 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  After complete workup and evaluation the plan was to follow-up on the IPMN of the pancreas with sequential MRI.  he is not a candidate for surgical resection of RCC because of  chronic renal insufficiency.  This was decided after  evaluation done by Urology and radiology team from the [facility]. Anatomically renal cell cancer on the right side  was stuck to the duodenum. Any  attempt in ablation would jeopardize the  the duodenum.  He was also found to have gallstones on evaluation however he did not have any symptoms.    After extensive discussion and evaluation and reasoning I discussed the plan of treatment with the family. So the plan is to proceed with ablation of the renal cell cancer.  He is not symptomatic off of gallstones.  Because he is diabetic, and at the  time of cholecystectomy the duodenum could be mobilized so that ablation could be done in a safe and effective manner.all the risks and benefits of the surgery such as bleeding infection DVT PE mi bile duct injury requiring more procedures, retained stones conversion to open were discussed consent was obtained    Finding:  Acute on chronic inflammation of gallbladder wall. Renal cell tumor seen on the anteromedial aspect of the right kidney stuck to the posterior wall of the duodenum     Procedure:  The patient was brought to the operating room and was placed in a supine position. He was sedated and intubated.  The abdomen was prepped and draped in the usual sterile manner. Time-out was done to verify the name and procedure and everyone concurred.    A small incision was made infraumbilically.  veress needle was inserted into the abdominal cavity and was insufflated with CO2.  After adequate insufflation, a 8 mm robotic port was inserted under direct vision. Under direct vision 8 mm port at the level of umbilicus at anterior axillary line on the right side, another 8 mm port at right midclavicular line at the level of umbilicus and another 8 mm port at the left midclavicular line at the level of the umbilicus were placed. The liver was visualized. The robot was docked. prograsp was used at right anterior axillary line port, bipolar grasper at midclavicle port and scissors through the left midclavicular port were introduced.    Using proper retraction the duodenum was completely mobilized. Extended Koche hot meds bulge was retracted superiorly into the right site. r maneuver was accomplished without any problem.  The renal cell cancer was found to be stuck to the posterior wall of the duodenum.  After complete mobilization of the duodenum, gallbladder fundus was retracted superiorly into the right side, the gardner's pouch was retracted superiorly into the right site. The cystic duct and cystic artery was well  dissected and isolated. The critical view was appreciated. 2 proximal hemol irrigated with that getting antibiotic solution and was completely drained ock clips proximally and one distally placed across cystic duct and and was transected in between.  The gallbladder was safely dissected from the liver bed meticulous hemostasis was accomplished.  The right upper quadrant was copiously irrigated under reamed.  The the gallbladder also placed in the Endobag was retracted outside.  Two strips of Surgicel was placed in between the kidney and the posterior wall of the duodenum and a flap of omentum was securely placed.  The robotic arms were un docked.  Gallbladder specimen was retrieved. The fascia was approximated from the extraction site. Incision was infiltrated with diluted mixture of Marcaine and Exparel.  Skin was approximated using 4-0 Monocryl and then Dermabond was applied over the skin incisions.  Patient tolerated the procedure well, was extubated taken to PACU in stable condition             Complications:none    Estimated Blood Loss (EBL): minmal         Implants:none  Specimens: gallbladder            Condition: Stable    Disposition: PACU - hemodynamically stable.    Attestation: I was present and scrubbed for the entire procedure.

## 2020-01-21 NOTE — INTERVAL H&P NOTE
Patient seen and examined this morning.  Denies fever, chills, chest pain, or shortness of breath.  Has been NPO since 10:00 p.m. last night.  To OR today for robotic cholecystectomy and duodenal mobilization.    Zac Viveros MD  [facility] General Surgery PGY2

## 2020-01-21 NOTE — TRANSFER OF CARE
"Anesthesia Transfer of Care Note    Patient: Javed Armijo    Procedure(s) Performed: Procedure(s) (LRB):  ROBOTIC CHOLECYSTECTOMY (N/A)    Patient location: [facility] Surgical Floor    Anesthesia Type: general    Transport from OR: Transported from OR on 6-10 L/min O2 by face mask with adequate spontaneous ventilation    Post pain: adequate analgesia    Post assessment: no apparent anesthetic complications and tolerated procedure well    Post vital signs: stable    Level of consciousness: awake, alert and oriented    Nausea/Vomiting: no nausea/vomiting    Complications: none    Transfer of care protocol was followed      Last vitals:   Visit Vitals  /67 (BP Location: Right arm, Patient Position: Lying)   Pulse (!) 56   Temp 36.8 °C (98.2 °F) (Temporal)   Resp 16   Ht 5' 9" (1.753 m)   Wt 111.1 kg (245 lb)   SpO2 98%   BMI 36.18 kg/m²     "

## 2020-01-22 ENCOUNTER — TELEPHONE (OUTPATIENT)
Dept: NEUROLOGY | Facility: HOSPITAL | Age: 75
End: 2020-01-22

## 2020-01-22 VITALS
TEMPERATURE: 98 F | DIASTOLIC BLOOD PRESSURE: 61 MMHG | HEIGHT: 69 IN | SYSTOLIC BLOOD PRESSURE: 132 MMHG | BODY MASS INDEX: 37.78 KG/M2 | OXYGEN SATURATION: 98 % | WEIGHT: 255.06 LBS | HEART RATE: 58 BPM | RESPIRATION RATE: 17 BRPM

## 2020-01-22 PROBLEM — K80.10 CHRONIC CALCULOUS CHOLECYSTITIS: Status: RESOLVED | Noted: 2020-01-21 | Resolved: 2020-01-22

## 2020-01-22 LAB
ALBUMIN SERPL BCP-MCNC: 3.2 G/DL (ref 3.5–5.2)
ALP SERPL-CCNC: 36 U/L (ref 55–135)
ALT SERPL W/O P-5'-P-CCNC: 89 U/L (ref 10–44)
ANION GAP SERPL CALC-SCNC: 7 MMOL/L (ref 8–16)
AST SERPL-CCNC: 109 U/L (ref 10–40)
BASOPHILS # BLD AUTO: 0.01 K/UL (ref 0–0.2)
BASOPHILS NFR BLD: 0.1 % (ref 0–1.9)
BILIRUB SERPL-MCNC: 0.6 MG/DL (ref 0.1–1)
BUN SERPL-MCNC: 45 MG/DL (ref 8–23)
CALCIUM SERPL-MCNC: 9.4 MG/DL (ref 8.7–10.5)
CHLORIDE SERPL-SCNC: 99 MMOL/L (ref 95–110)
CO2 SERPL-SCNC: 28 MMOL/L (ref 23–29)
CREAT SERPL-MCNC: 2.2 MG/DL (ref 0.5–1.4)
DIFFERENTIAL METHOD: ABNORMAL
EOSINOPHIL # BLD AUTO: 0.4 K/UL (ref 0–0.5)
EOSINOPHIL NFR BLD: 5.5 % (ref 0–8)
ERYTHROCYTE [DISTWIDTH] IN BLOOD BY AUTOMATED COUNT: 14.7 % (ref 11.5–14.5)
EST. GFR  (AFRICAN AMERICAN): 33 ML/MIN/1.73 M^2
EST. GFR  (NON AFRICAN AMERICAN): 29 ML/MIN/1.73 M^2
GLUCOSE SERPL-MCNC: 220 MG/DL (ref 70–110)
HCT VFR BLD AUTO: 38.6 % (ref 40–54)
HGB BLD-MCNC: 11.8 G/DL (ref 14–18)
LYMPHOCYTES # BLD AUTO: 0.6 K/UL (ref 1–4.8)
LYMPHOCYTES NFR BLD: 8.3 % (ref 18–48)
MCH RBC QN AUTO: 29.1 PG (ref 27–31)
MCHC RBC AUTO-ENTMCNC: 30.6 G/DL (ref 32–36)
MCV RBC AUTO: 95 FL (ref 82–98)
MONOCYTES # BLD AUTO: 0.6 K/UL (ref 0.3–1)
MONOCYTES NFR BLD: 7.8 % (ref 4–15)
NEUTROPHILS # BLD AUTO: 5.8 K/UL (ref 1.8–7.7)
NEUTROPHILS NFR BLD: 78.3 % (ref 38–73)
PLATELET # BLD AUTO: 166 K/UL (ref 150–350)
PMV BLD AUTO: 10.7 FL (ref 9.2–12.9)
POCT GLUCOSE: 205 MG/DL (ref 70–110)
POCT GLUCOSE: 244 MG/DL (ref 70–110)
POTASSIUM SERPL-SCNC: 5.4 MMOL/L (ref 3.5–5.1)
PROT SERPL-MCNC: 6.5 G/DL (ref 6–8.4)
RBC # BLD AUTO: 4.06 M/UL (ref 4.6–6.2)
SODIUM SERPL-SCNC: 134 MMOL/L (ref 136–145)
WBC # BLD AUTO: 7.39 K/UL (ref 3.9–12.7)

## 2020-01-22 PROCEDURE — G0378 HOSPITAL OBSERVATION PER HR: HCPCS

## 2020-01-22 PROCEDURE — 36415 COLL VENOUS BLD VENIPUNCTURE: CPT

## 2020-01-22 PROCEDURE — 99900035 HC TECH TIME PER 15 MIN (STAT)

## 2020-01-22 PROCEDURE — 25000003 PHARM REV CODE 250: Performed by: STUDENT IN AN ORGANIZED HEALTH CARE EDUCATION/TRAINING PROGRAM

## 2020-01-22 PROCEDURE — 25000003 PHARM REV CODE 250: Performed by: SURGERY

## 2020-01-22 PROCEDURE — 94761 N-INVAS EAR/PLS OXIMETRY MLT: CPT

## 2020-01-22 PROCEDURE — 80053 COMPREHEN METABOLIC PANEL: CPT

## 2020-01-22 PROCEDURE — 85025 COMPLETE CBC W/AUTO DIFF WBC: CPT

## 2020-01-22 PROCEDURE — 94799 UNLISTED PULMONARY SVC/PX: CPT

## 2020-01-22 PROCEDURE — 96372 THER/PROPH/DIAG INJ SC/IM: CPT

## 2020-01-22 PROCEDURE — 27000221 HC OXYGEN, UP TO 24 HOURS

## 2020-01-22 RX ORDER — HYDROCODONE BITARTRATE AND ACETAMINOPHEN 5; 325 MG/1; MG/1
1 TABLET ORAL EVERY 6 HOURS PRN
Qty: 20 TABLET | Refills: 0 | Status: SHIPPED | OUTPATIENT
Start: 2020-01-22

## 2020-01-22 RX ADMIN — CARVEDILOL 6.25 MG: 6.25 TABLET, FILM COATED ORAL at 09:01

## 2020-01-22 RX ADMIN — INSULIN ASPART 4 UNITS: 100 INJECTION, SOLUTION INTRAVENOUS; SUBCUTANEOUS at 12:01

## 2020-01-22 RX ADMIN — CYANOCOBALAMIN TAB 1000 MCG 1000 MCG: 1000 TAB at 09:01

## 2020-01-22 RX ADMIN — GABAPENTIN 100 MG: 100 CAPSULE ORAL at 09:01

## 2020-01-22 RX ADMIN — FOLIC ACID 1 MG: 1 TABLET ORAL at 09:01

## 2020-01-22 RX ADMIN — POLYETHYLENE GLYCOL 3350 17 G: 17 POWDER, FOR SOLUTION ORAL at 09:01

## 2020-01-22 RX ADMIN — INSULIN ASPART 4 UNITS: 100 INJECTION, SOLUTION INTRAVENOUS; SUBCUTANEOUS at 05:01

## 2020-01-22 RX ADMIN — LEVOTHYROXINE SODIUM 100 MCG: 50 TABLET ORAL at 05:01

## 2020-01-22 RX ADMIN — FENOFIBRATE 145 MG: 145 TABLET ORAL at 09:01

## 2020-01-22 RX ADMIN — ATORVASTATIN CALCIUM 20 MG: 20 TABLET, FILM COATED ORAL at 09:01

## 2020-01-22 RX ADMIN — ASPIRIN 81 MG: 81 TABLET, COATED ORAL at 09:01

## 2020-01-22 RX ADMIN — FUROSEMIDE 20 MG: 20 TABLET ORAL at 09:01

## 2020-01-22 RX ADMIN — GABAPENTIN 100 MG: 100 CAPSULE ORAL at 12:01

## 2020-01-22 RX ADMIN — OXYCODONE HYDROCHLORIDE AND ACETAMINOPHEN 1 TABLET: 5; 325 TABLET ORAL at 04:01

## 2020-01-22 NOTE — TELEPHONE ENCOUNTER
Called pt charanjit to inform him that 1 month post op appt is 2/20/20 at 1040 am. Ask that they give me a call if the appt needs to be changed

## 2020-01-22 NOTE — NURSING
IV removed with catheter tip intact, pressure dressing applied. AVS given to pt. VN to review d/c instructions. NAD noted. Pt awaiting wheelchair transport by pt escort.

## 2020-01-22 NOTE — PLAN OF CARE
Progress notes reviewed. Evening rounds completed. Introduced self as VN for this shift. Admit questions and med Rec completed. Patient notified VN he does use cpap at home .Educated pt on VN's role in patient's care.  Plan of care reviewed with patient. Opportunity given for pt's questions. No questions or concerns expressed at this time. VN to continue to monitor.

## 2020-01-22 NOTE — PLAN OF CARE
Patient on oxygen with documented flow.  Will attempt to wean per O2 order protocol. Patient performs IS therapy well and is ready for self-instruct. Will continue to monitor.

## 2020-01-22 NOTE — ANESTHESIA POSTPROCEDURE EVALUATION
Anesthesia Post Evaluation    Patient: Javed Armijo    Procedure(s) Performed: Procedure(s) (LRB):  ROBOTIC CHOLECYSTECTOMY (N/A)    Final Anesthesia Type: general    Patient location during evaluation: PACU  Patient participation: Yes- Able to Participate  Level of consciousness: awake and alert, oriented and awake  Post-procedure vital signs: reviewed and stable  Pain management: adequate  Airway patency: patent    PONV status at discharge: No PONV  Anesthetic complications: no      Cardiovascular status: blood pressure returned to baseline  Respiratory status: unassisted and room air  Hydration status: euvolemic  Follow-up not needed.          Vitals Value Taken Time   /61 1/22/2020 11:31 AM   Temp 36.4 °C (97.5 °F) 1/22/2020 11:31 AM   Pulse 58 1/22/2020 11:31 AM   Resp 17 1/22/2020 11:31 AM   SpO2 98 % 1/22/2020  7:44 AM         Event Time     Out of Recovery 18:45:00          Pain/Casey Score: Pain Rating Prior to Med Admin: 3 (1/22/2020  4:16 AM)  Pain Rating Post Med Admin: 2 (1/21/2020  6:45 PM)

## 2020-01-22 NOTE — TELEPHONE ENCOUNTER
----- Message from Stella Harrison sent at 1/22/2020 10:04 AM CST -----  Contact: Chelsey AZAR   MARIA ESTHER-----Chelsey AZAR is calling in regards to SUSY GUADALUPE  needing to speak with the Nurse.  Chelsey AZAR stated that she is awaiting the call due to the pt having surgery on 1/23/2020    Please call and advise

## 2020-01-22 NOTE — TELEPHONE ENCOUNTER
----- Message from Shelly Jacobo sent at 1/22/2020  8:21 AM CST -----  [facility] - [phone]  EXT [id number]  MARIA ESTHER: Needs to speak with you on a SHERRY that was sent to her. Please advise

## 2020-01-22 NOTE — TELEPHONE ENCOUNTER
[facility] - she is confused as to the referral and authorization.  She informed me that [facility] was not able to help her yet but she would keep calling.  I called [facility] at 12:10PM and spoke with Selma, who informed me that the authorization for this patient is appropriate, active, and that she has no record of the [facility] trying to contact them regarding this patient.

## 2020-01-22 NOTE — PROGRESS NOTES
"Follow-up With  Details  Why  Contact Info   Kendrick Martin MD  In 1 month  the office will contact you with a follow up apt   [address]  [address]  [facility] [address] [phone]   Dee Matamoros MD  On 1/24/2020  "press 3" 2:00 pm fax # [phone] [address] [address] [phone]       "

## 2020-01-22 NOTE — PLAN OF CARE
Patient has received discharge instructions. Prescriptions received. Instructions reviewed with pt using teachback method. All questions answered to pt satisfaction. IV access  removed per floor nurse. Transport requested for discharge.         In addition to teaching sheets included with avs  Pt and wife were given a list of foods to avoid that were high in potassium

## 2020-01-22 NOTE — NURSING
Permission attained to enter room via virtual system.  Virtual rounds completed as documented.  Today's lab values, notes, and vital signs up to now have been reviewed.  Pt is ready for discharge

## 2020-01-22 NOTE — PLAN OF CARE
Pt AAOx4. Minimal complaints of pain, denies need for pain meds. Consumed 2 cups of chicken broth, jello and water with no complaints of nausea. 4 abdominal lap sites remains intact with dermabond. Remains on 2L NC. Pt OOB with stand-by assist. Blood glucose monitoring continued. Safety maintained. Will continue to monitor.

## 2020-01-22 NOTE — CARE UPDATE
Patient encouraged to wear cpap  States that he does not wish to wear it tonight.  Patient remains on NC at 2 lpm with no resp distress.

## 2020-01-22 NOTE — PLAN OF CARE
"TN met with pt and wife Francesca prior to d/c   reviewed f/u apts   per Agata with Scheduling - Surgeon's office to call pt with apt;  f/u apt made with pcp - d/c info faxed to office at      pt has transportation to home;   meds delivered to bedside per [facility]; reviewed written discharge info with pt     Follow-up With   Details   Why   Contact Info   Kendrick Martin MD   In 1 month   the office will contact you with a follow up apt    [address]  [address]  [facility] [address]  [phone]   Dee Matamoros MD   On 1/24/2020   "press 3" 2:00 pm fax # [phone] [address] [facility] [address] [phone]             01/22/20 1320   Final Note   Assessment Type Final Discharge Note   Anticipated Discharge Disposition Home   What phone number can be called within the next 1-3 days to see how you are doing after discharge? [phone]   Hospital Follow Up  Appt(s) scheduled? Yes   Discharge plans and expectations educations in teach back method with documentation complete? Yes   Right Care Referral Info   Post Acute Recommendation No Care   Referral Type   (no care )     "

## 2020-01-22 NOTE — TELEPHONE ENCOUNTER
----- Message from Agata Luna sent at 1/22/2020 12:52 PM CST -----  Contact: Tomeka matt/ Case Management  [phone]  MARIA ESTHER Eckert is requesting to speak with nurse regarding scheduling a hospital follow up for pt within 1 month. Please call patient and advise.

## 2020-01-22 NOTE — DISCHARGE SUMMARY
[facility]  General Surgery  Discharge Summary      Patient Name: Javed Armijo  MRN: [id number]  Admission Date: 1/21/2020  Hospital Length of Stay: 1 days  Discharge Date and Time:  01/22/2020 4:57 PM  Attending Physician: Kendrick Martin MD   Discharging Provider: Zac Viveros MD  Primary Care Provider: Dee Matamoros MD      Procedure(s) (LRB):  ROBOTIC CHOLECYSTECTOMY (N/A)        Hospital Course:    74-year-old male with history of intraductal papillary mucinous neoplasm of the pancreas, chronic  Kidney disease, diabetes, and right renal cell carcinoma.  The patient is scheduled for ablation therapy of the right kidney and  Required cholecystectomy and duodenal mobilization prior to initiation of this therapy.  The patient was taken the operating room on 01/21 for robotic cholecystectomy and what immobilization which was performed without complication.  The patient did well postoperatively with no acute events overnight.  The following morning the patient was tolerating regular diet, with ambulating without difficulty, and pain was well controlled.  The patient was discharged on 01/22 without complication.      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    Specialty:  General Surgery  Contact  information:  [address] [facility]  [address]  [place] [address]  [phone]                 Patient Instructions:      Diet diabetic     Notify your health care provider if you experience any of the following:  increased confusion or weakness     Notify your health care provider if you experience any of the following:  persistent dizziness, light-headedness, or visual disturbances     Notify your health care provider if you experience any of the following:  worsening rash     Notify your health care provider if you experience any of the following:  severe persistent headache     Notify your health care provider if you experience any of the following:  difficulty breathing or increased cough     Notify your health care provider if you experience any of the following:  redness, tenderness, or signs of infection (pain, swelling, redness, odor or green/yellow discharge around incision site)     Notify your health care provider if you experience any of the following:  severe uncontrolled pain     Notify your health care provider if you experience any of the following:  persistent nausea and vomiting or diarrhea     Notify your health care provider if you experience any of the following:  temperature >100.4     No dressing needed     Activity as tolerated   Order Comments: No heavy lifting or strenuous activity for 4 weeks. Okay to shower and allow soap/water to run over incisions. No swimming or baths for 2 weeks. Avoid high potassium foods in the next week. Follow up with Primary Care Doctor in the coming week.     Medications:  Reconciled Home Medications:      Medication List      START taking these medications    HYDROcodone-acetaminophen 5-325 mg per tablet  Commonly known as:  NORCO  Take 1 tablet by mouth every 6 (six) hours as needed for Pain.        CONTINUE taking these medications    aspirin 81 MG EC tablet  Commonly known as:  ECOTRIN  Take 81 mg by mouth once daily.     carvedilol 6.25 MG tablet  Commonly  known as:  COREG  Take 6.25 mg by mouth.     Claritin-D 12 Hour 5-120 mg per tablet  Generic drug:  loratadine-pseudoephedrine 5-120 mg  Take 1 tablet by mouth 2 (two) times daily.     cyanocobalamin 1000 MCG tablet  Commonly known as:  VITAMIN B-12  Take 1,000 mcg by mouth.     folic acid 1 MG tablet  Commonly known as:  FOLVITE  Take 1 mg by mouth.     furosemide 20 MG tablet  Commonly known as:  LASIX  Take 20 mg by mouth once daily.     gabapentin 100 MG capsule  Commonly known as:  NEURONTIN  Take 100 mg by mouth 2 (two) times daily.     hydrOXYzine HCl 10 MG Tab  Commonly known as:  ATARAX  Take 10 mg by mouth 3 (three) times daily as needed.     Lantus Solostar U-100 Insulin glargine 100 units/mL (3mL) SubQ pen  Generic drug:  insulin  60 Units.     levothyroxine 100 MCG tablet  Commonly known as:  SYNTHROID  Take 100 mcg by mouth.     LIPITOR ORAL  Take 20 mg by mouth.     NovoLOG Flexpen U-100 Insulin 100 unit/mL (3 mL) Inpn pen  Generic drug:  insulin aspart U-100  20 Units every evening.     omega-3 fatty acids 100 mg Chew  Take 4,000 mg by mouth.     Tricor 145 MG tablet  Generic drug:  fenofibrate  Take by mouth.     Victoza 2-Reji 0.6 mg/0.1 mL (18 mg/3 mL) Pnij  Generic drug:  liraglutide 0.6 mg/0.1 mL (18 mg/3 mL) subq PNIJ  1.8 mg.     Vitamin D2 50,000 unit Cap  Generic drug:  ergocalciferol  Take 50,000 Units by mouth every 30 days.          Time spent on the discharge of patient: <30 minutes    Zac Viveros MD  General Surgery  [facility]

## 2020-01-27 ENCOUNTER — TELEPHONE (OUTPATIENT)
Dept: NEUROLOGY | Facility: HOSPITAL | Age: 75
End: 2020-01-27

## 2020-01-27 NOTE — TELEPHONE ENCOUNTER
Spoke with Maya at [facility] - discussed case in order to appropriately get authorizations in place and fully understand confusion of who patient was originally referred to.  She stated she would call back when she had more information.

## 2020-01-27 NOTE — TELEPHONE ENCOUNTER
----- Message from Bakari Ruiz sent at 1/27/2020 11:16 AM CST -----  Contact: Maya Stringer / [phone]   Maya would like to speak with your office regarding care and billing. Please Advise.

## 2020-01-28 ENCOUNTER — TELEPHONE (OUTPATIENT)
Dept: NEUROLOGY | Facility: HOSPITAL | Age: 75
End: 2020-01-28

## 2020-01-28 NOTE — TELEPHONE ENCOUNTER
Spoke with wife and she states that pt has not has BM since his surgery. I explained that the intestines are the last to wake up from anesthesia. I suggested that they try dulcolax suppositories and pills to see if that would stimulate the bowels to working. I told her that if they had not results then let me know and I will ask Dr. Birch for a suggestion.  I also discuss he ablation procedure coming up. I told her that I discuss with Dr. Birch. Dr. Birch says that there is a window of time that this need to be done and he don't want to miss that window. I told Ms Armijo that when they come in on 2/4/20 to see Dr. Reyes he will explain all the details more in depth. She verbalized understanding

## 2020-01-28 NOTE — TELEPHONE ENCOUNTER
Spoke with Maya at [facility] - sent her the authorization we have on file since she states that [facility] does not see it and claims to have not sent it.  I sent her the auth that we received on 11/1/19.

## 2020-01-28 NOTE — TELEPHONE ENCOUNTER
----- Message from Dianna Viveros sent at 1/28/2020  9:21 AM CST -----  Contact: [phone]/wife Francesca  Patient called in returning your call. Please advise.

## 2020-01-28 NOTE — TELEPHONE ENCOUNTER
----- Message from Alix Ramey sent at 1/28/2020  8:52 AM CST -----  Contact: Wife [phone]  MARIA ESTHER - Patient's wife is calling to ask if her  can take a laxative because he hasn't had a bowel movement since his surgery and she is worried. Please call

## 2020-01-30 ENCOUNTER — TELEPHONE (OUTPATIENT)
Dept: NEUROLOGY | Facility: HOSPITAL | Age: 75
End: 2020-01-30

## 2020-01-30 LAB
FINAL PATHOLOGIC DIAGNOSIS: NORMAL
GROSS: NORMAL

## 2020-01-30 NOTE — TELEPHONE ENCOUNTER
Spoke to wife and told her that [facility] is [facility] to [facility] the [facility] issues. She also let me know that the suggestions for the constipation problems were used and resolved the pt problems and he is doing good. They are  Ready for his procedures next week.

## 2020-01-30 NOTE — TELEPHONE ENCOUNTER
----- Message from Shelly Jacobo sent at 1/30/2020 10:46 AM CST -----  Contact: [facility] / [phone]  MARIA ESTHER: Needs to speak with you on a billing issue. Please advise

## 2020-01-31 ENCOUNTER — TELEPHONE (OUTPATIENT)
Dept: NEUROLOGY | Facility: HOSPITAL | Age: 75
End: 2020-01-31

## 2020-01-31 NOTE — TELEPHONE ENCOUNTER
Spoke with patient and let him know we got the updated authorization from [facility] all figured out and approved.  We are waiting to hear back about the IR appointment, but we may have to push that back.  He understood and had no further questions.

## 2020-02-05 ENCOUNTER — TELEPHONE (OUTPATIENT)
Dept: NEUROLOGY | Facility: HOSPITAL | Age: 75
End: 2020-02-05

## 2020-02-05 NOTE — TELEPHONE ENCOUNTER
Spoke with Lyric at [facility] - explained to her that the new auth number we have been given not only does not actually authorize and ablation or anything associated with IR, but it is actually for Urology after I expressed to the [facility] multiple times that this is not an appropriate case for a urologist.  She sent a new request and explanation to her team leads and marked it as Urgent.  She informed me that I should get a fax by the end of today.

## 2020-02-05 NOTE — TELEPHONE ENCOUNTER
[facility] with [facility] - I informed her of some of the issues we are having trying to treat this .  She requested I fax a copy of the SHERRY for Dr. Reyes and all clinicals to [phone].  I sent all documentation and am awaiting more information.

## 2020-02-07 ENCOUNTER — TELEPHONE (OUTPATIENT)
Dept: NEUROLOGY | Facility: HOSPITAL | Age: 75
End: 2020-02-07

## 2020-02-07 NOTE — TELEPHONE ENCOUNTER
Spoke with pt and he said the [facility] called and told him he had approval for his procedure and he wants to know when it will be scheduled. I sent a message to Felicitas to check and then I will send a message to Melanie to set it up   none

## 2020-02-13 ENCOUNTER — HOSPITAL ENCOUNTER (OUTPATIENT)
Dept: RADIOLOGY | Facility: HOSPITAL | Age: 75
Discharge: HOME | End: 2020-02-13
Attending: RADIOLOGY
Payer: COMMERCIAL

## 2020-02-13 ENCOUNTER — OFFICE VISIT (OUTPATIENT)
Dept: INTERVENTIONAL RADIOLOGY/VASCULAR | Facility: CLINIC | Age: 75
End: 2020-02-13
Payer: COMMERCIAL

## 2020-02-13 VITALS — BODY MASS INDEX: 36.73 KG/M2 | WEIGHT: 248 LBS | HEIGHT: 69 IN

## 2020-02-13 DIAGNOSIS — C64.1 RENAL CELL ADENOCARCINOMA, RIGHT: ICD-10-CM

## 2020-02-13 DIAGNOSIS — C64.1 RENAL CELL ADENOCARCINOMA, RIGHT: Primary | ICD-10-CM

## 2020-02-13 PROCEDURE — 74176 CT ABD & PELVIS W/O CONTRAST: CPT | Mod: TC

## 2020-02-13 PROCEDURE — 99215 PR OFFICE/OUTPT VISIT, EST, LEVL V, 40-54 MIN: ICD-10-PCS | Mod: S$PBB,,, | Performed by: RADIOLOGY

## 2020-02-13 PROCEDURE — 99215 OFFICE O/P EST HI 40 MIN: CPT | Mod: S$PBB,,, | Performed by: RADIOLOGY

## 2020-02-13 PROCEDURE — 99212 OFFICE O/P EST SF 10 MIN: CPT | Mod: PBBFAC,PO,25

## 2020-02-13 PROCEDURE — 99999 PR PBB SHADOW E&M-EST. PATIENT-LVL II: ICD-10-PCS | Mod: PBBFAC,,,

## 2020-02-13 PROCEDURE — 74176 CT ABDOMEN PELVIS WITHOUT CONTRAST: ICD-10-PCS | Mod: 26,,, | Performed by: RADIOLOGY

## 2020-02-13 PROCEDURE — 74176 CT ABD & PELVIS W/O CONTRAST: CPT | Mod: 26,,, | Performed by: RADIOLOGY

## 2020-02-13 PROCEDURE — 99999 PR PBB SHADOW E&M-EST. PATIENT-LVL II: CPT | Mod: PBBFAC,,,

## 2020-02-13 NOTE — PROGRESS NOTES
OUTPATIENT INTERVENTIONAL RADIOLOGY CONSULTATION    PATIENT: Javed Armijo  MRN: [id number]  : 1945  DATE OF SERVICE: 2020    REFERRING PROVIDER:   Kendrick Martin Md  [address]  [address]  [place] [address]     CHIEF COMPLAINT: Right renal mass    HISTORY OF PRESENT ILLNESS: Javed Armijo is a 74 y.o. male who presents with a solid right renal mass measuring 3.9 cm. Initially detected on an MRI performed 10/11/19. No associated signs or symptoms.     His history dates to 2019 when he sought care for a rash on his lower extremities. He was diagnosed with Kyrle's disease and underwent an imaging of the abdomen to evaluate the kidneys. This demonstrated the solid right renal mass of concern, as well as a cystic lesion of the pancreas. He was seen by Urology Dr. Sharif and percutaneous ablation was recommended for the renal mass but no procedure was scheduled. He was also referred to Dr. Marks for evaluation the pancreatic cyst. Dr. Marks recommended watchful waiting for the pancreatic lesion and performed a robotic cholecystectomy for cholelithiasis. During that procedure, he also performed a Kocher to provide separation between the duodenum and the renal mass. He was then referred to IR for ablation. He presents today to discuss this option.    Mr. Armijo says he feels fully recovered from cholecystectomy. Required prescription pain medications for just 1 day, then switched to Tylenol. He endorses swelling of his lower extremities and orthopnea at baseline and weight loss over the past 2 mos (15 lbs) which he attributes decreased appetite. Denies dysuria, hematuria, or urinary frequency. Has slept in a recliner for many yrs. Denies past problems with anesthesia.    Past Medical History:   Diagnosis Date    CKD (chronic kidney disease)     DM (diabetes mellitus)     HTN (hypertension)     Hyperlipidemia     Hypothyroidism     Kidney mass     TIFFANIE (obstructive sleep apnea)     on  CPAP   CAD  Kyrle's disease    Past Surgical History:   Procedure Laterality Date    CARDIAC SURGERY      CORONARY ARTERY BYPASS GRAFT  06/2017    x2    JOINT REPLACEMENT Bilateral     MUSCLE REPAIR Right 10/2014    bicep    MUSCLE REPAIR Right     Arm    NASAL SEPTOPLASTY  10/2018    ROBOT-ASSISTED CHOLECYSTECTOMY N/A 1/21/2020    Procedure: ROBOTIC CHOLECYSTECTOMY;  Surgeon: Kendrick Martin MD;  Location: [facility];  Service: General;  Laterality: N/A;    TOTAL KNEE ARTHROPLASTY  2010    UMBILICAL HERNIA REPAIR  12/2011      No family history on file.   Social History     Socioeconomic History    Marital status:      Spouse name: Not on file    Number of children: Not on file    Years of education: Not on file    Highest education level: Not on file   Occupational History    Not on file   Social Needs    Financial resource strain: Not on file    Food insecurity:     Worry: Not on file     Inability: Not on file    Transportation needs:     Medical: Not on file     Non-medical: Not on file   Tobacco Use    Smoking status: Former Smoker    Smokeless tobacco: Never Used   Substance and Sexual Activity    Alcohol use: Yes     Frequency: Monthly or less    Drug use: Never    Sexual activity: Not on file   Lifestyle    Physical activity:     Days per week: Not on file     Minutes per session: Not on file    Stress: Not on file   Relationships    Social connections:     Talks on phone: Not on file     Gets together: Not on file     Attends Zoroastrian service: Not on file     Active member of club or organization: Not on file     Attends meetings of clubs or organizations: Not on file     Relationship status: Not on file   Other Topics Concern    Not on file   Social History Narrative    Not on file      Retired   Quit smoking 50 yrs ago  Rarely drinks  Lives with wife    Review of patient's allergies indicates:   Allergen Reactions    Lisinopril      Current  Outpatient Medications   Medication Sig    aspirin (ECOTRIN) 81 MG EC tablet Take 81 mg by mouth once daily.    atorvastatin calcium (LIPITOR ORAL) Take 20 mg by mouth.    carvedilol (COREG) 6.25 MG tablet Take 6.25 mg by mouth.    cyanocobalamin (VITAMIN B-12) 1000 MCG tablet Take 1,000 mcg by mouth.    ergocalciferol (VITAMIN D2) 50,000 unit Cap Take 50,000 Units by mouth every 30 days.    fenofibrate (TRICOR) 145 MG tablet Take by mouth.    folic acid (FOLVITE) 1 MG tablet Take 1 mg by mouth.    furosemide (LASIX) 20 MG tablet Take 20 mg by mouth once daily.    gabapentin (NEURONTIN) 100 MG capsule Take 100 mg by mouth 2 (two) times daily.    HYDROcodone-acetaminophen (NORCO) 5-325 mg per tablet Take 1 tablet by mouth every 6 (six) hours as needed for Pain.    hydrOXYzine HCl (ATARAX) 10 MG Tab Take 10 mg by mouth 3 (three) times daily as needed.    insulin (LANTUS SOLOSTAR U-100 INSULIN) glargine 100 units/mL (3mL) SubQ pen 60 Units.     insulin aspart U-100 (NOVOLOG FLEXPEN U-100 INSULIN) 100 unit/mL (3 mL) InPn pen 20 Units every evening.     levothyroxine (SYNTHROID) 100 MCG tablet Take 100 mcg by mouth.    liraglutide 0.6 mg/0.1 mL, 18 mg/3 mL, subq PNIJ (VICTOZA 2-CITLALY) 0.6 mg/0.1 mL (18 mg/3 mL) PnIj 1.8 mg.    loratadine-pseudoephedrine 5-120 mg (CLARITIN-D 12 HOUR) 5-120 mg per tablet Take 1 tablet by mouth 2 (two) times daily.    omega-3 fatty acids 100 mg Chew Take 4,000 mg by mouth.     Current Facility-Administered Medications   Medication Frequency    lidocaine (PF) 10 mg/ml (1%) injection 10 mg Once        REVIEW OF SYSTEMS:  General: + for weight loss, negative for chills, fever  Psychological: negative for hallucination, depression  Ophthalmic: negative for changes in vision, eye pain  ENT: negative for sore throat and rhinorrhea  Respiratory: negative for cough, shortness of breath, wheezing  Cardiovascular: + for orthopnea, negative for chest pain  Gastrointestinal: negative  "for abdominal pain, change in bowel habits, and black/bloody stools  Genitourinary: see hpi  Musculoskeletal: + for edema, negative for gait disturbance  Neurological: negative for HA, syncope, seizure  Dermatological: + for rash, negative for jaundice    PHYSICAL EXAM:    Vitals:    02/13/20 0935   Weight: 112.5 kg (248 lb 0.3 oz)   Height: 5' 9" (1.753 m)     General: awake, cooperative, no acute distress  HENT: normocephalic, atraumatic, neck symmetric, no nasal discharge   Eyes: conjunctivae/corneas clear, PERRL, EOM's intact   Lungs: equal excursions, clear to auscultation bilaterally  Heart: regular rate and rhythm  Abdomen: non-distended, soft, non-tender, no CVA tenderness  Extremities: 2+ pitting ankle edema bilaterally, moves all extremities well  Integument: Small erupted papules over both lower extremities, no associated erythema or induration, no purulence  Neurologic: alert and oriented x3, normal gait  Psychiatric: no pressured speech, normal affect    ECOG Score: 1    LABS:  Lab Results   Component Value Date     (L) 01/22/2020    K 5.4 (H) 01/22/2020    CL 99 01/22/2020    CO2 28 01/22/2020    BUN 45 (H) 01/22/2020      Lab Results   Component Value Date    CALCIUM 9.4 01/22/2020      Lab Results   Component Value Date    ALKPHOS 36 (L) 01/22/2020     (H) 01/22/2020    ALT 89 (H) 01/22/2020    BILITOT 0.6 01/22/2020    ALBUMIN 3.2 (L) 01/22/2020      Lab Results   Component Value Date    WBC 7.39 01/22/2020    HGB 11.8 (L) 01/22/2020    HCT 38.6 (L) 01/22/2020    MCV 95 01/22/2020     01/22/2020        Lab Results   Component Value Date    INR 1.1 01/09/2020     No results found for: AFP  No results found for this or any previous visit (from the past 24 hour(s)).  No results found for this or any previous visit (from the past 168 hour(s)).    IMAGING:   MRI abd 10/11/2019 and CT AP 11/14/19 reviewed by me. 3.9 cm mass along the anteromedial aspect of the interpolar right kidney. " Touches duodenum.    ASSESSMENT/PLAN:   Javed Armijo is a 74 y.o. male with an incidentally detected solid right renal mass measuring 3.9 cm. Statistically, this likely represents a small RCC. Treatment options include partial nephrectomy and perc ablation, both of which have similar oncologic outcomes for T1a RCCs. He was seen by Urology and, given co-morbidities, perc ablation was recommended. I concur and believe he is a candidate for perc ablation. Biopsy will be performed during the same session to both confirm the diagnosis and plan follow-up.    Risks (including, but not limited to, pain, bleeding, infection, damage to nearby structures, damage up to and including loss of the kidney, renal failure, treatment failure/disease recurrence, the need for additional procedures, MI, and death), potential benefits, and alternatives were discussed with the patient. All questions were answered to the best of my abilities. The patient verbalized understanding and wishes to proceed with ablation and biopsy. He has not taken ASA for approx 7 days and fish oil for approx 14 days.    1. CT AP wo contrast today to eval post-op anatomy  2. Image-guided cryoablation tomorrow with anesthesia assistance  3. AM labs  4. Tentative plan to admit for overnight obs  5. MRI abd wo and w contrast and clinic visit with me in 4-6 wks    Thank you for the opportunity to participate in the care of this patient.    I, Kieran Reyes MD, was personally present for this clinic visit and examined the patient.  I agree with the findings, assessment and plan of care as documented in this note.  I spent a total of 45 minutes reviewing records and imaging, counseling, discussing the examination, therapy, and treatment plan of the patient's condition.        Kieran Reyes MD  [facility]  Pager [phone]

## 2020-02-14 ENCOUNTER — TELEPHONE (OUTPATIENT)
Dept: INTERVENTIONAL RADIOLOGY/VASCULAR | Facility: CLINIC | Age: 75
End: 2020-02-14

## 2020-02-14 NOTE — TELEPHONE ENCOUNTER
CT AP wo contrast performed yesterday interpreted by me. The right ureter/UPJ abuts the target lesion along it's posterior margin and the duodenum abuts the target lesion along it's anterior margin. May be able to hydrodissect between the lesion and the duodenum to achieve sufficient separation there, but unlikely to be able to do the same to achieve sufficient separation between the lesion and the ureter. This increases the risk of damage to the ureter/collecting system.     Partial nephrectomy felt to be a better option than perc ablation in this case. Will refer back to Dr. Sharif for further mgmt.    Above d/w patient and his wife by phone this am. They verbalized understanding and were appreciative. Thank you for the opportunity to assist in Mr. Armijo's care.      [facility]  Pager [phone]

## 2020-02-18 ENCOUNTER — TELEPHONE (OUTPATIENT)
Dept: NEUROLOGY | Facility: HOSPITAL | Age: 75
End: 2020-02-18

## 2020-02-18 NOTE — TELEPHONE ENCOUNTER
Spoke with pt wife. She was concerned that Dr. Reyes did not do the procedure of the kidney. He told her that it was not safe and to see an urologist. I gave her Dr. Lane's name as a doctor to see. She was veru appreciative.

## 2020-02-19 ENCOUNTER — TELEPHONE (OUTPATIENT)
Dept: UROLOGY | Facility: CLINIC | Age: 75
End: 2020-02-19

## 2020-02-19 NOTE — TELEPHONE ENCOUNTER
Contacted patient to set up consultation after receiving request/clinical information from [facility].  Scheduled for tomorrow at 1300 hours (patient will see Dr Birch tomorrow, get lunch and see Dr Lane).  Spoke with Phoebe at [facility] to obtain authorization.  #[id number].  Will fax updated information to our office.  Patient has not had any external testing performed.

## 2020-02-20 ENCOUNTER — OFFICE VISIT (OUTPATIENT)
Dept: NEUROLOGY | Facility: HOSPITAL | Age: 75
End: 2020-02-20
Attending: SURGERY
Payer: COMMERCIAL

## 2020-02-20 ENCOUNTER — OFFICE VISIT (OUTPATIENT)
Dept: UROLOGY | Facility: CLINIC | Age: 75
End: 2020-02-20
Payer: COMMERCIAL

## 2020-02-20 VITALS
TEMPERATURE: 97 F | DIASTOLIC BLOOD PRESSURE: 68 MMHG | WEIGHT: 245.56 LBS | HEART RATE: 68 BPM | BODY MASS INDEX: 36.27 KG/M2 | SYSTOLIC BLOOD PRESSURE: 137 MMHG

## 2020-02-20 VITALS — HEART RATE: 70 BPM | TEMPERATURE: 98 F | DIASTOLIC BLOOD PRESSURE: 59 MMHG | SYSTOLIC BLOOD PRESSURE: 109 MMHG

## 2020-02-20 DIAGNOSIS — N28.89 RENAL MASS: Primary | ICD-10-CM

## 2020-02-20 DIAGNOSIS — Z09 POSTOP CHECK: Primary | ICD-10-CM

## 2020-02-20 DIAGNOSIS — N18.4 CHRONIC RENAL DISEASE, STAGE 4, SEVERELY DECREASED GLOMERULAR FILTRATION RATE BETWEEN 15-29 ML/MIN/1.73 SQUARE METER: ICD-10-CM

## 2020-02-20 DIAGNOSIS — I25.10 CORONARY ARTERY DISEASE, ANGINA PRESENCE UNSPECIFIED, UNSPECIFIED VESSEL OR LESION TYPE, UNSPECIFIED WHETHER NATIVE OR TRANSPLANTED HEART: ICD-10-CM

## 2020-02-20 PROCEDURE — 99204 PR OFFICE/OUTPT VISIT, NEW, LEVL IV, 45-59 MIN: ICD-10-PCS | Mod: S$PBB,,, | Performed by: STUDENT IN AN ORGANIZED HEALTH CARE EDUCATION/TRAINING PROGRAM

## 2020-02-20 PROCEDURE — 99999 PR PBB SHADOW E&M-EST. PATIENT-LVL III: ICD-10-PCS | Mod: PBBFAC,,, | Performed by: STUDENT IN AN ORGANIZED HEALTH CARE EDUCATION/TRAINING PROGRAM

## 2020-02-20 PROCEDURE — 99213 OFFICE O/P EST LOW 20 MIN: CPT | Mod: PBBFAC,27,PO | Performed by: STUDENT IN AN ORGANIZED HEALTH CARE EDUCATION/TRAINING PROGRAM

## 2020-02-20 PROCEDURE — 99204 OFFICE O/P NEW MOD 45 MIN: CPT | Mod: S$PBB,,, | Performed by: STUDENT IN AN ORGANIZED HEALTH CARE EDUCATION/TRAINING PROGRAM

## 2020-02-20 PROCEDURE — 99213 OFFICE O/P EST LOW 20 MIN: CPT | Performed by: SURGERY

## 2020-02-20 PROCEDURE — 99999 PR PBB SHADOW E&M-EST. PATIENT-LVL III: CPT | Mod: PBBFAC,,, | Performed by: STUDENT IN AN ORGANIZED HEALTH CARE EDUCATION/TRAINING PROGRAM

## 2020-02-20 NOTE — PROGRESS NOTES
Subjective:       Patient ID: Javed Armijo is a 74 y.o. male.    Chief Complaint: Consult (Renal tumor)  This is a 74 y.o.  male patient that is new to me.  The patient is referred to me by Dr. Marks's office for a right renal mass. He and his wife are here today. He has a history of DM, HTN, CKD serum Cr 2.2, TIFFANIE, CAD s/p CABG. He takes Aspirin 81mg. He had a right anterior renal mass measuring 3.9cm on CT 11/2019 and 4.4 on CT 2/13/20. Both CT scans are noncontrasted due to his CKD as he cannot receive IV contrast.         Lab Results   Component Value Date    CREATININE 2.2 (H) 01/22/2020        ---  Past Medical History:   Diagnosis Date    Allergy     CKD (chronic kidney disease)     DM (diabetes mellitus)     HTN (hypertension)     Hyperlipidemia     Hypothyroidism     Kidney mass     TIFFANIE (obstructive sleep apnea)     on CPAP       Past Surgical History:   Procedure Laterality Date    CARDIAC SURGERY      CORONARY ARTERY BYPASS GRAFT  06/2017    x2    JOINT REPLACEMENT Bilateral     MUSCLE REPAIR Right 10/2014    bicep    MUSCLE REPAIR Right     Arm    NASAL SEPTOPLASTY  10/2018    ROBOT-ASSISTED CHOLECYSTECTOMY N/A 1/21/2020    Procedure: ROBOTIC CHOLECYSTECTOMY;  Surgeon: Kendrick Martin MD;  Location: [facility] OR;  Service: General;  Laterality: N/A;    TOTAL KNEE ARTHROPLASTY  2010    UMBILICAL HERNIA REPAIR  12/2011       History reviewed. No pertinent family history.    Social History     Tobacco Use    Smoking status: Former Smoker    Smokeless tobacco: Never Used   Substance Use Topics    Alcohol use: Yes     Frequency: Monthly or less    Drug use: Never       Current Outpatient Medications on File Prior to Visit   Medication Sig Dispense Refill    aspirin (ECOTRIN) 81 MG EC tablet Take 81 mg by mouth once daily.      atorvastatin calcium (LIPITOR ORAL) Take 20 mg by mouth.      carvedilol (COREG) 6.25 MG tablet Take 6.25 mg by mouth.      cyanocobalamin (VITAMIN B-12)  1000 MCG tablet Take 1,000 mcg by mouth.      ergocalciferol (VITAMIN D2) 50,000 unit Cap Take 50,000 Units by mouth every 30 days.      fenofibrate (TRICOR) 145 MG tablet Take by mouth.      folic acid (FOLVITE) 1 MG tablet Take 1 mg by mouth.      furosemide (LASIX) 20 MG tablet Take 20 mg by mouth once daily.      gabapentin (NEURONTIN) 100 MG capsule Take 100 mg by mouth 2 (two) times daily.      HYDROcodone-acetaminophen (NORCO) 5-325 mg per tablet Take 1 tablet by mouth every 6 (six) hours as needed for Pain. 20 tablet 0    hydrOXYzine HCl (ATARAX) 10 MG Tab Take 10 mg by mouth 3 (three) times daily as needed.      insulin (LANTUS SOLOSTAR U-100 INSULIN) glargine 100 units/mL (3mL) SubQ pen 60 Units.       insulin aspart U-100 (NOVOLOG FLEXPEN U-100 INSULIN) 100 unit/mL (3 mL) InPn pen 20 Units every evening.       levothyroxine (SYNTHROID) 100 MCG tablet Take 100 mcg by mouth.      liraglutide 0.6 mg/0.1 mL, 18 mg/3 mL, subq PNIJ (VICTOZA 2-CITLALY) 0.6 mg/0.1 mL (18 mg/3 mL) PnIj 1.8 mg.      loratadine-pseudoephedrine 5-120 mg (CLARITIN-D 12 HOUR) 5-120 mg per tablet Take 1 tablet by mouth 2 (two) times daily.      omega-3 fatty acids 100 mg Chew Take 4,000 mg by mouth.       Current Facility-Administered Medications on File Prior to Visit   Medication Dose Route Frequency Provider Last Rate Last Dose    lidocaine (PF) 10 mg/ml (1%) injection 10 mg  1 mL Intradermal Once Kendrick Martin MD           Review of patient's allergies indicates:   Allergen Reactions    Lisinopril        Review of Systems   Constitutional: Negative for chills.   HENT: Negative for congestion.    Eyes: Negative for visual disturbance.   Respiratory: Negative for shortness of breath.    Cardiovascular: Negative for chest pain.   Gastrointestinal: Negative for abdominal distention.   Musculoskeletal: Negative for gait problem.   Skin: Negative for color change.   Neurological: Negative for dizziness.    Psychiatric/Behavioral: Negative for agitation.       Objective:      Physical Exam   Constitutional: He appears well-developed and well-nourished.   HENT:   Head: Normocephalic.   Eyes: Pupils are equal, round, and reactive to light.   Neck: Normal range of motion.   Cardiovascular: Intact distal pulses.   Pulmonary/Chest: Effort normal.   Abdominal: Soft. He exhibits no distension. There is no tenderness.   2+   Musculoskeletal: Normal range of motion.   Neurological: He is alert.   Skin: Skin is warm and dry.   Psychiatric: He has a normal mood and affect.       Assessment:       1. Renal mass    2. Chronic renal disease, stage 4, severely decreased glomerular filtration rate between 15-29 mL/min/1.73 square meter    3. Coronary artery disease, angina presence unspecified, unspecified vessel or lesion type, unspecified whether native or transplanted heart        Plan:         1. I discussed that for his right renal mass, I would not feel comfortable performing a right partial nephrectomy. I would offer a right robotic assisted radical nephrectomy. In addition to the routine risks of a nephrectomy surgery, he would be at higher risk for worsening renal function to the point of requiring hemodialysis. I would obtain a NM MAG3 renal scan for preoperative counseling to evaluate the patient's split renal function.   2. He just had a robotic cholecystectomy performed, would likely await right radical nephrectomy for at least 2 months to perform.  3. Would also require cardiology clearance.   4. [facility]. Prior to his visit I did speak with Dr. Marcus Sharif and Dr. Marks.    Renal mass    Chronic renal disease, stage 4, severely decreased glomerular filtration rate between 15-29 mL/min/1.73 square meter    Coronary artery disease, angina presence unspecified, unspecified vessel or lesion type, unspecified whether native or transplanted heart

## 2020-02-20 NOTE — PROGRESS NOTES
[facility]OLAN[facility]:  [facility]  A collaboration between [facility] and [facility]        Chief Complaint:  post op follow up    S/p:   1/21/2020 - Robotic Cholecystectomy     Pathology: A. Gallbladder , Cholecystectomy:  -Adenomyoma.  -Cholelithiasis.  -Chronic cholecystitis.    Vital Sign:   Vitals:    02/20/20 1038   BP: 137/68   Pulse: 68   Temp: 96.7 °F (35.9 °C)   TempSrc: Oral   Weight: 111.4 kg (245 lb 9.5 oz)       Incision: healing well    Appetite: increased    Restrictions: NO Restrictions    Return to clinic: 3 months

## 2020-02-20 NOTE — LETTER
February 20, 2020      Kendrick Martin MD  [address]  [address]  [facility] [address]           [facility]  [address]  [facility] [address]  Phone: [phone]          Patient: Javed Armijo   MR Number: [id number]   YOB: 1945   Date of Visit: 2/20/2020       Dear Dr. Kendrick Martin:    Thank you for referring Javed Armijo to me for evaluation. Attached you will find relevant portions of my assessment and plan of care.    If you have questions, please do not hesitate to call me. I look forward to following Javed Armijo along with you.    Sincerely,    Amita Lane MD    Enclosure  CC:  No Recipients    If you would like to receive this communication electronically, please contact [email or web address] or [phone] to request more information on [facility] access.    For providers and/or their staff who would like to refer a patient to [facility], please contact us through our one-stop-shop provider referral line, [facility], at [phone].    If you feel you have received this communication in error or would no longer like to receive these types of communications, please e-mail [email or web address]

## 2020-02-24 ENCOUNTER — TELEPHONE (OUTPATIENT)
Dept: NEUROLOGY | Facility: HOSPITAL | Age: 75
End: 2020-02-24

## 2020-02-24 NOTE — TELEPHONE ENCOUNTER
[facility] - she is going to forward this case to the nurse supervisor to see what can be cleared up with the authorization confusion.  I gave her my phone and fax number and she said someone would be in touch.

## 2020-03-05 ENCOUNTER — TELEPHONE (OUTPATIENT)
Dept: CARDIOTHORACIC SURGERY | Facility: CLINIC | Age: 75
End: 2020-03-05

## 2020-03-05 NOTE — TELEPHONE ENCOUNTER
----- Message from Lupe Rodas sent at 3/5/2020  8:31 AM CST -----  Contact: Keri with [employer] [phone]   Caller would like to schedule an appt for loose hardware in sternum and [facility] would [facility] be [facility] Dr. Anglin

## 2020-11-27 ENCOUNTER — HOSPITAL ENCOUNTER (EMERGENCY)
Facility: HOSPITAL | Age: 75
Discharge: HOME | End: 2020-11-27
Attending: FAMILY MEDICINE
Payer: OTHER GOVERNMENT

## 2020-11-27 VITALS
TEMPERATURE: 99 F | OXYGEN SATURATION: 96 % | RESPIRATION RATE: 20 BRPM | WEIGHT: 240 LBS | HEART RATE: 56 BPM | DIASTOLIC BLOOD PRESSURE: 60 MMHG | HEIGHT: 69 IN | BODY MASS INDEX: 35.55 KG/M2 | SYSTOLIC BLOOD PRESSURE: 122 MMHG

## 2020-11-27 DIAGNOSIS — U07.1 COVID-19 VIRUS INFECTION: Primary | ICD-10-CM

## 2020-11-27 LAB — SARS-COV-2 RDRP RESP QL NAA+PROBE: NEGATIVE

## 2020-11-27 PROCEDURE — 63700000 PHARM REV CODE 250 ALT 637 W/O HCPCS: Mod: ER | Performed by: FAMILY MEDICINE

## 2020-11-27 PROCEDURE — 99284 EMERGENCY DEPT VISIT MOD MDM: CPT | Mod: 25,ER

## 2020-11-27 PROCEDURE — U0002 COVID-19 LAB TEST NON-CDC: HCPCS | Mod: ER

## 2020-11-27 PROCEDURE — 25000003 PHARM REV CODE 250: Mod: ER | Performed by: FAMILY MEDICINE

## 2020-11-27 RX ORDER — AZITHROMYCIN 250 MG/1
250 TABLET, FILM COATED ORAL DAILY
Qty: 4 TABLET | Refills: 0 | Status: SHIPPED | OUTPATIENT
Start: 2020-11-27 | End: 2020-12-01

## 2020-11-27 RX ORDER — BENZONATATE 100 MG/1
200 CAPSULE ORAL
Status: COMPLETED | OUTPATIENT
Start: 2020-11-27 | End: 2020-11-27

## 2020-11-27 RX ORDER — AZITHROMYCIN 250 MG/1
500 TABLET, FILM COATED ORAL
Status: COMPLETED | OUTPATIENT
Start: 2020-11-27 | End: 2020-11-27

## 2020-11-27 RX ORDER — BENZONATATE 200 MG/1
200 CAPSULE ORAL 3 TIMES DAILY PRN
Qty: 30 CAPSULE | Refills: 0 | Status: SHIPPED | OUTPATIENT
Start: 2020-11-27 | End: 2020-12-07

## 2020-11-27 RX ADMIN — BENZONATATE 200 MG: 100 CAPSULE ORAL at 11:11

## 2020-11-27 RX ADMIN — AZITHROMYCIN MONOHYDRATE 500 MG: 250 TABLET ORAL at 11:11

## 2020-11-27 NOTE — Clinical Note
"Javed Casas" Zofia was seen and treated in our emergency department on 11/27/2020.  He may return to work on 12/07/2020.       If you have any questions or concerns, please don't hesitate to call.      ALIX Jim RN    "

## 2020-11-27 NOTE — ED PROVIDER NOTES
Encounter Date: 11/27/2020       History     Chief Complaint   Patient presents with    COVID-19 Concerns     Pt states has been exposed to COVID 19 and has started with cough yesterday.  Denies fever.      35-year-old male complains of nasal congestion and cough.  Has been exposed to COVID-19 infected person.  No respiratory distress.  No fever.    The history is provided by the patient.     Review of patient's allergies indicates:   Allergen Reactions    Lisinopril      Past Medical History:   Diagnosis Date    Allergy     CKD (chronic kidney disease)     DM (diabetes mellitus)     HTN (hypertension)     Hyperlipidemia     Hypothyroidism     Kidney mass     TIFFANIE (obstructive sleep apnea)     on CPAP     Past Surgical History:   Procedure Laterality Date    CARDIAC SURGERY      CORONARY ARTERY BYPASS GRAFT  06/2017    x2    JOINT REPLACEMENT Bilateral     MUSCLE REPAIR Right 10/2014    bicep    MUSCLE REPAIR Right     Arm    NASAL SEPTOPLASTY  10/2018    ROBOT-ASSISTED CHOLECYSTECTOMY N/A 1/21/2020    Procedure: ROBOTIC CHOLECYSTECTOMY;  Surgeon: Kendrick Martin MD;  Location: [facility];  Service: General;  Laterality: N/A;    TOTAL KNEE ARTHROPLASTY  2010    UMBILICAL HERNIA REPAIR  12/2011     History reviewed. No pertinent family history.  Social History     Tobacco Use    Smoking status: Former Smoker    Smokeless tobacco: Never Used   Substance Use Topics    Alcohol use: Yes     Frequency: Monthly or less    Drug use: Never     Review of Systems   Constitutional: Positive for fever. Negative for activity change, appetite change and chills.   HENT: Positive for congestion. Negative for ear discharge, rhinorrhea, sinus pressure, sinus pain, sore throat and trouble swallowing.    Eyes: Negative for photophobia, pain, discharge, redness, itching and visual disturbance.   Respiratory: Positive for cough. Negative for chest tightness, shortness of breath and wheezing.    Cardiovascular:  Negative for chest pain, palpitations and leg swelling.   Gastrointestinal: Negative for abdominal distention, abdominal pain, constipation, diarrhea, nausea and vomiting.   Genitourinary: Negative for dysuria, flank pain, frequency and hematuria.   Musculoskeletal: Negative for back pain, gait problem, neck pain and neck stiffness.   Skin: Negative for rash and wound.   Neurological: Negative for dizziness, tremors, seizures, syncope, speech difficulty, weakness, light-headedness, numbness and headaches.   Psychiatric/Behavioral: Negative for behavioral problems, confusion, hallucinations and sleep disturbance. The patient is not nervous/anxious.    All other systems reviewed and are negative.      Physical Exam     Initial Vitals [11/27/20 0936]   BP Pulse Resp Temp SpO2   137/62 (!) 56 18 98.7 °F (37.1 °C) 96 %      MAP       --         Physical Exam    Nursing note and vitals reviewed.  Constitutional: Vital signs are normal. He appears well-developed and well-nourished. He is active.   HENT:   Head: Normocephalic and atraumatic.   Right Ear: Tympanic membrane normal.   Left Ear: Tympanic membrane normal.   Nose: Nose normal.   Mouth/Throat: Oropharynx is clear and moist.   Eyes: Conjunctivae and lids are normal.   Neck: Trachea normal, normal range of motion and full passive range of motion without pain. Neck supple. Normal range of motion present. No neck rigidity.   Cardiovascular: Normal rate, regular rhythm, S1 normal, S2 normal, normal heart sounds, intact distal pulses and normal pulses.   Pulmonary/Chest: Breath sounds normal. No respiratory distress. He has no wheezes. He has no rhonchi. He has no rales. He exhibits no tenderness.   Abdominal: Soft. Normal appearance and bowel sounds are normal. He exhibits no distension. There is no abdominal tenderness.   Musculoskeletal: Normal range of motion.   Lymphadenopathy:     He has no cervical adenopathy.   Neurological: He is alert and oriented to person,  place, and time. He has normal strength and normal reflexes. No cranial nerve deficit or sensory deficit. GCS score is 15. GCS eye subscore is 4. GCS verbal subscore is 5. GCS motor subscore is 6.   Skin: Skin is warm and intact. Capillary refill takes less than 2 seconds. No abrasion, no bruising and no rash noted.   Psychiatric: He has a normal mood and affect. His speech is normal and behavior is normal. Judgment and thought content normal. He is not actively hallucinating. Cognition and memory are normal. He is attentive.         ED Course   Procedures  Labs Reviewed   SARS-COV-2 RNA AMPLIFICATION, QUAL          Imaging Results          X-Ray Chest AP Portable (Final result)  Result time 11/27/20 10:45:41    Final result by Javed Weaver MD (11/27/20 10:45:41)                 Impression:      No acute findings.      Electronically signed by: Javed Weaver MD  Date:    11/27/2020  Time:    10:45             Narrative:    EXAMINATION:  XR CHEST AP PORTABLE    CLINICAL HISTORY:  cough;    TECHNIQUE:  Single frontal view of the chest was performed.    COMPARISON:  01/17/2020    FINDINGS:  Stable postop changes mediastinum.  Stable mild cardiomegaly.    The lungs are clear.  No pleural effusions.    No acute osseous finding.                                 Medical Decision Making:   Initial Assessment:   Nasal congestion, cough and exposure to COVID-19.  Differential Diagnosis:   COVID-19 infection, pneumonia, bronchitis, URI.  Clinical Tests:   Lab Tests: Ordered and Reviewed  Radiological Study: Ordered and Reviewed  ED Management:  Patient is negative on rapid COVID but his partner is positive for COVID who is staying with him all the time.  I would suspect patient also has similar symptoms but test has been pulse negative.  And advised him to take same COVID measures and the home quarantine for 10 days.  Zithromax, Tessalon Perles have been prescribed.  Advised him to follow-up ED with shortness of breath.  Or  any other worsening symptoms.                             Clinical Impression:     ICD-10-CM ICD-9-CM   1. COVID-19 virus infection  U07.1 079.89                      Disposition:   Disposition: Discharged  Condition: Stable     ED Disposition Condition    Discharge Stable        ED Prescriptions     Medication Sig Dispense Start Date End Date Auth. Provider    benzonatate (TESSALON) 200 MG capsule Take 1 capsule (200 mg total) by mouth 3 (three) times daily as needed. 30 capsule 11/27/2020 12/7/2020 Caio Manzo MD    azithromycin (Z-CITLALY) 250 MG tablet Take 1 tablet (250 mg total) by mouth once daily. for 4 days 4 tablet 11/27/2020 12/1/2020 Caio Manzo MD        Follow-up Information     Follow up With Specialties Details Why Contact Info    Dee Matamoros MD Internal Medicine   [address] [facility] [address] [phone]                                         Caio Manzo MD  11/27/20 5997

## (undated) DEVICE — APPLIER MEDIUM LARGE CLIP

## (undated) DEVICE — KIT WING PAD POSITIONING

## (undated) DEVICE — PORT ACCESS 5MM W/120MM

## (undated) DEVICE — ADHESIVE DERMABOND ADVANCED

## (undated) DEVICE — OBTURATOR BLADELESS 8MM XI CLR

## (undated) DEVICE — PORT AIRSEAL 12/120MM LPI

## (undated) DEVICE — SEE MEDLINE ITEM 156952

## (undated) DEVICE — SUT VCRL ENDOLOOP 0 18 VIOL

## (undated) DEVICE — IRRIGATOR ENDOWRIST XI SUCTION

## (undated) DEVICE — HOOK PERM MONOPOLAR CAUTERY

## (undated) DEVICE — SYR 10CC LUER LOCK

## (undated) DEVICE — GLOVE SURG BIOGEL LATEX SZ 7.5

## (undated) DEVICE — NDL INSUF ULTRA VERESS 120MM

## (undated) DEVICE — NDL HYPDRM 23X15

## (undated) DEVICE — MANIFOLD 4 PORT

## (undated) DEVICE — COVER TIP CURVED SCISSORS XI

## (undated) DEVICE — HEMOSTAT SURGICEL 4X8IN

## (undated) DEVICE — SEAL UNIVERSAL 5MM-8MM XI

## (undated) DEVICE — DRAPE ARM DAVINCI XI

## (undated) DEVICE — SCISSOR 5MMX35CM DIRECT DRIVE

## (undated) DEVICE — ELECTRODE REM PLYHSV RETURN 9

## (undated) DEVICE — DRAPE INCISE IOBAN 2 23X23IN

## (undated) DEVICE — DRESSING AQUACEL SACRAL 9 X 9

## (undated) DEVICE — CORD BIPOLAR ENERGY INST XI